# Patient Record
Sex: MALE | Race: OTHER | Employment: OTHER | URBAN - METROPOLITAN AREA
[De-identification: names, ages, dates, MRNs, and addresses within clinical notes are randomized per-mention and may not be internally consistent; named-entity substitution may affect disease eponyms.]

---

## 2018-06-19 NOTE — ED NOTES
Tolerated suturing well. Bleeding controlled at this time. Pt denies any new complaints. Awaiting disposition.

## 2018-06-19 NOTE — ED INITIAL ASSESSMENT (HPI)
Pt with states a piece of wood cut his leg today- now bleeding. Pt was at work- bandage in place, controlled. Tetanus not up to date.

## 2018-06-19 NOTE — ED PROVIDER NOTES
Patient Seen in: [facility] AND [facility] Emergency Department    History   CC: leg wound  HPI: Xochilt Duncan 52year old male w/ hx HTN, and hyperlipidemia who presents to the ER c/o right-sided leg abrasion which occurred over a varicose vein causing vari General - Appears well, non-toxic and in NAD  Head - Appears symmetrical without deformity/swelling cranium, scalp, or facial bones  Skin - + small, <0.25cm abrasion noted to the right mid distal, anterior shin. + actively bleeding.  Skin otherwise

## 2018-12-03 PROBLEM — I10 ESSENTIAL HYPERTENSION: Status: ACTIVE | Noted: 2018-12-03

## 2018-12-03 PROBLEM — G47.33 OSA (OBSTRUCTIVE SLEEP APNEA): Status: ACTIVE | Noted: 2018-12-03

## 2018-12-03 PROBLEM — Z78.9 ALCOHOL USE: Status: ACTIVE | Noted: 2018-12-03

## 2018-12-03 PROBLEM — F10.90 ALCOHOL USE: Status: ACTIVE | Noted: 2018-12-03

## 2018-12-03 NOTE — PROGRESS NOTES
HPI:    Patient ID: Tania Abraham is a 52year old male. HPI  Patient comes in today for first time to establish care also needs annual physical exam for insurance.   He has history of high blood pressure cholesterol is taking medications complaint h Date   • REPAIR ING HERNIA,5+Y/O,REDUCIBL        History reviewed. No pertinent family history. Social History: Social History    Tobacco Use      Smoking status: Never Smoker      Smokeless tobacco: Never Used    Alcohol use:  Yes      Alcohol/week: 1.2 severe obstructive sleep apnea he has not gotten his CPAP machine  Alcohol use needs to stop drinking especially during the week more than 1 beer or drink at night his son healthy and this probably makes his sleep apnea worse.   Bmi 40.0-44.9, adult (hcc) t

## 2018-12-04 NOTE — PATIENT INSTRUCTIONS
ASSESSMENT/PLAN:   Encounter to establish care  (primary encounter diagnosis)-patient here for first time to establish care we will order labs  Annual physical exam exam as above will order labs  Essential hypertension-well-controlled  Acute pain of right

## 2019-06-02 NOTE — ED INITIAL ASSESSMENT (HPI)
Pt c/o bleeding, states he was in the shower and he started bleeding from right lower leg. No bleeding at this time. Denies injury to area.

## 2019-06-04 NOTE — ED PROVIDER NOTES
Patient Seen in: [facility] AND [facility] Emergency Department    History   Patient presents with:  Bleeding (hematologic)    Stated Complaint: varicose vein bleeding    HPI    61 yo male bleeding from a varicose vein started while he was in the shower prior to normal.   Nursing note and vitals reviewed.            ED Course   Labs Reviewed - No data to display           MDM                 Disposition and Plan     Clinical Impression:  Bleeding from varicose veins of lower extremity, right  (primary encounter brando

## 2019-09-02 NOTE — ED PROVIDER NOTES
Patient Seen in: [facility] AND [facility] Emergency Department    History   Patient presents with:  Varicose Veins (cardiovascular)  Bleeding (hematologic)    Stated Complaint: Bleeding varicose vein    HPI    HPI: Sudeep Zafar is a 62year old male who pr leg with small ulcerated varicosity with slow oozing of blood  . 2+ distal pulses. NEURO:Sensation to touch is intact. SKIN: laceration noted above  PSYCH: Normal affect. Calm and cooperative.         ED Course   Labs Reviewed - No data to display    Pro

## 2019-09-02 NOTE — ED INITIAL ASSESSMENT (HPI)
The patient reports bleeding from anterior left lower leg varicose vein since last night at 2000 pm. Pressure applied, bleeding controlled.

## 2019-09-02 NOTE — ED NOTES
Pt able to dress independently and ambulates with steady gait. Discharge instructions reviewed and pt/family verbalized understanding. Accompanied by family.

## 2020-07-20 NOTE — TELEPHONE ENCOUNTER
Spoke to pt to see if he is seeing Dr Elif Underwood as his PCP or if he has a new PCP. Pt states not knowing for sure whom is his PCP and will have his wife call back to give us an update.    Pt is due for physical if they are still seeing dr Skye Kay as their PCP,

## 2023-07-13 NOTE — PATIENT INSTRUCTIONS
1. Schedule colonoscopy with monitored anesthesia care (MAC) at the hospital    2.  bowel prep from pharmacy (split trilyte or golytely). As we discussed it is important to take the bowel preparation in two parts taking 2L of the liquid the night before the procedure and the second 2L the morning of the procedure starting approximately 6 hours prior to your scheduled procedure time. 3. Medication    Hold metformin the day before and day of the procedure  Otherwise, continue all medications for procedure    4. Read all bowel prep instructions carefully    5. AVOID seeds, nuts, popcorn, raw fruits and vegetables (cooked is okay) for 2-3 days before procedure    >>>Please note: if you were prescribed a bowel prep and it is too expensive or not covered by insurance, it is okay to substitute Trilyte or Golytely (or any similar generic prep). This can be done by notifying the pharmacy or calling our office.

## 2023-07-13 NOTE — TELEPHONE ENCOUNTER
Scheduled for:  Colonoscopy 30776/17732  Provider Name:  Dr. Ashley Causey  Date:  09/29/2023  Location:  [place]  Sedation:  MAC  Time:  2:00 pm, arrival 1:00 pm  Prep:  Golytely  Meds/Allergies Reconciled?:  Physician reviewed  Diagnosis with codes:  Screening for colon cancer Z12.11; Hx of colon polyps Z86.010  Was patient informed to call insurance with codes (Y/N):  Yes  Referral sent?:  Referral was sent at the time of electronic surgical scheduling. [address] [place] [address] or [facility] [address] [place] [address] notified?: I sent an electronic request to Endo Scheduling and received a confirmation today. Medication Orders: \"Hold metformin the day before and day of the procedure\"  Misc Orders:  N/A     Further instructions given by staff:  I provided patient with [place] prep instruction sheet.

## 2023-07-27 NOTE — TELEPHONE ENCOUNTER
Results were discussed with patient's wife, she will inform him and she was advised to have him keep his appt with Dr Lee Denis.      ----- Message from Randee Donovan MD sent at 7/25/2023 11:57 AM CDT -----  Please inform him that his fecal occult blood test is negative. I still recommend strongly that he follows with gastroenterology. He has established with Dr. Lee Denis and has colonoscopy scheduled in September 2023.

## 2023-09-22 ENCOUNTER — TELEPHONE (OUTPATIENT)
Dept: PULMONOLOGY | Facility: CLINIC | Age: 58
End: 2023-09-22

## 2023-09-22 NOTE — TELEPHONE ENCOUNTER
[facility] with CMN order. Placed in [employer] for signature.

## 2023-09-28 ENCOUNTER — TELEPHONE (OUTPATIENT)
Facility: CLINIC | Age: 58
End: 2023-09-28

## 2023-09-28 NOTE — TELEPHONE ENCOUNTER
[facility] has questions regarding prep instructions for 9/29/2023 CLN. Please call. Thank you.

## 2023-09-29 ENCOUNTER — ANESTHESIA (OUTPATIENT)
Dept: ENDOSCOPY | Facility: HOSPITAL | Age: 58
End: 2023-09-29
Payer: COMMERCIAL

## 2023-09-29 ENCOUNTER — ANESTHESIA EVENT (OUTPATIENT)
Dept: ENDOSCOPY | Facility: HOSPITAL | Age: 58
End: 2023-09-29
Payer: COMMERCIAL

## 2023-09-29 ENCOUNTER — HOSPITAL ENCOUNTER (OUTPATIENT)
Facility: HOSPITAL | Age: 58
Setting detail: HOSPITAL OUTPATIENT SURGERY
Discharge: HOME | End: 2023-09-29
Attending: INTERNAL MEDICINE | Admitting: INTERNAL MEDICINE
Payer: COMMERCIAL

## 2023-09-29 VITALS
RESPIRATION RATE: 16 BRPM | HEIGHT: 64 IN | DIASTOLIC BLOOD PRESSURE: 82 MMHG | HEART RATE: 67 BPM | OXYGEN SATURATION: 95 % | WEIGHT: 270 LBS | BODY MASS INDEX: 46.1 KG/M2 | SYSTOLIC BLOOD PRESSURE: 128 MMHG

## 2023-09-29 DIAGNOSIS — K63.5 SIGMOID POLYP: ICD-10-CM

## 2023-09-29 DIAGNOSIS — K63.5 POLYP OF CECUM: ICD-10-CM

## 2023-09-29 DIAGNOSIS — K63.5 POLYP OF DESCENDING COLON: ICD-10-CM

## 2023-09-29 DIAGNOSIS — Z12.11 SCREEN FOR COLON CANCER: ICD-10-CM

## 2023-09-29 DIAGNOSIS — K63.5 POLYP OF TRANSVERSE COLON, UNSPECIFIED TYPE: ICD-10-CM

## 2023-09-29 DIAGNOSIS — Z86.010 HISTORY OF COLON POLYPS: ICD-10-CM

## 2023-09-29 PROBLEM — Z12.12 ENCOUNTER FOR COLORECTAL CANCER SCREENING: Status: ACTIVE | Noted: 2023-09-29

## 2023-09-29 LAB — GLUCOSE BLDC GLUCOMTR-MCNC: 113 MG/DL (ref 70–99)

## 2023-09-29 PROCEDURE — 45380 COLONOSCOPY AND BIOPSY: CPT | Performed by: INTERNAL MEDICINE

## 2023-09-29 PROCEDURE — 0DBN8ZX EXCISION OF SIGMOID COLON, VIA NATURAL OR ARTIFICIAL OPENING ENDOSCOPIC, DIAGNOSTIC: ICD-10-PCS | Performed by: INTERNAL MEDICINE

## 2023-09-29 PROCEDURE — 0DBL8ZX EXCISION OF TRANSVERSE COLON, VIA NATURAL OR ARTIFICIAL OPENING ENDOSCOPIC, DIAGNOSTIC: ICD-10-PCS | Performed by: INTERNAL MEDICINE

## 2023-09-29 PROCEDURE — 0DBH8ZX EXCISION OF CECUM, VIA NATURAL OR ARTIFICIAL OPENING ENDOSCOPIC, DIAGNOSTIC: ICD-10-PCS | Performed by: INTERNAL MEDICINE

## 2023-09-29 PROCEDURE — 45385 COLONOSCOPY W/LESION REMOVAL: CPT | Performed by: INTERNAL MEDICINE

## 2023-09-29 RX ORDER — SODIUM CHLORIDE, SODIUM LACTATE, POTASSIUM CHLORIDE, CALCIUM CHLORIDE 600; 310; 30; 20 MG/100ML; MG/100ML; MG/100ML; MG/100ML
INJECTION, SOLUTION INTRAVENOUS CONTINUOUS
Status: DISCONTINUED | OUTPATIENT
Start: 2023-09-29 | End: 2023-09-29

## 2023-09-29 RX ORDER — LIDOCAINE HYDROCHLORIDE 10 MG/ML
INJECTION, SOLUTION EPIDURAL; INFILTRATION; INTRACAUDAL; PERINEURAL AS NEEDED
Status: DISCONTINUED | OUTPATIENT
Start: 2023-09-29 | End: 2023-09-29 | Stop reason: SURG

## 2023-09-29 RX ADMIN — SODIUM CHLORIDE, SODIUM LACTATE, POTASSIUM CHLORIDE, CALCIUM CHLORIDE: 600; 310; 30; 20 INJECTION, SOLUTION INTRAVENOUS at 14:11:00

## 2023-09-29 RX ADMIN — LIDOCAINE HYDROCHLORIDE 50 MG: 10 INJECTION, SOLUTION EPIDURAL; INFILTRATION; INTRACAUDAL; PERINEURAL at 14:12:00

## 2023-09-29 NOTE — ANESTHESIA POSTPROCEDURE EVALUATION
Patient: Rebecca Magaña    Procedure Summary       Date: 09/29/23 Room / Location: [address] [place] [address] ENDOSCOPY 01 / [address] [place] [address] ENDOSCOPY    Anesthesia Start: 9549 Anesthesia Stop:     Procedure: COLONOSCOPY Diagnosis:       Screen for colon cancer      History of colon polyps      (colon polyps, diverticulosis, hemorrhoids)    Surgeons: Ruby Goss MD Anesthesiologist: Mabel Khan CRNA    Anesthesia Type: MAC ASA Status: 3            Anesthesia Type: MAC    Vitals Value Taken Time   BP 90/71 09/29/23 1442   Temp  09/29/23 1443   Pulse 81 09/29/23 1442   Resp 20 09/29/23 1442   SpO2 93 % 09/29/23 1442       [facility] AN Post Evaluation:   Patient Evaluated in Patient location: endo 20. Patient Participation: complete - patient participated  Level of Consciousness: awake and alert  Pain Score: 0  Pain Management: adequate  Airway Patency:patent  Dental exam unchanged from preop  Yes    Cardiovascular Status: stable  Respiratory Status: room air  Postoperative Hydration stable      [facility]  Jm Brooks CRNA  9/29/2023 2:43 PM

## 2023-09-29 NOTE — DISCHARGE INSTRUCTIONS

## 2023-09-29 NOTE — H&P
History & Physical Examination    Patient Name: Larisa Valdovinos  MRN: [id number]  CSN: [id number]  YOB: 1965    Diagnosis: colorectal cancer screening, history of adenomatous colon polyps    Olmesartan Medoxomil-HCTZ 40-25 MG Oral Tab, Take 1 tablet by mouth daily. , Disp: 90 tablet, Rfl: 0, 2023 at 0800  metFORMIN  MG Oral Tablet 24 Hr, Take 1 tablet (500 mg total) by mouth daily. , Disp: 90 tablet, Rfl: 0, 2023 at 0800  atorvastatin 20 MG Oral Tab, Take 1 tablet (20 mg total) by mouth nightly., Disp: 90 tablet, Rfl: 0, 2023 at 2100  azelastine 0.1 % Nasal Solution, 1 spray by Nasal route 2 (two) times daily. , Disp: 1 each, Rfl: 1  albuterol 108 (90 Base) MCG/ACT Inhalation Aero Soln, Inhale 1 puff into the lungs every 4 (four) hours as needed. , Disp: 1 each, Rfl: 1, 2023 at 1800  PEG 3350-KCl-NaBcb-NaCl-NaSulf (PEG-3350/ELECTROLYTES) 236 g Oral Recon Soln, , Disp: , Rfl:   [] polyethylene glycol, PEG 3350-KCl-NaBcb-NaCl-NaSulf, 236 g Oral Recon Soln, Take 4,000 mL by mouth once for 1 dose., Disp: 4000 mL, Rfl: 0      lactated ringers infusion, , Intravenous, Continuous        Allergies: No Known Allergies    Past Medical History:   Diagnosis Date    Diabetes ([facility] [address]     Essential hypertension     High blood pressure     High cholesterol     Hyperlipidemia      Past Surgical History:   Procedure Laterality Date    REPAIR ING HERNIA,5+Y/O,REDUCIBL       Family History   Problem Relation Age of Onset    Heart Disorder Father     Cancer Mother     No Known Problems Daughter     No Known Problems Son     No Known Problems Sister      Social History    Tobacco Use      Smoking status: Never      Smokeless tobacco: Never    Alcohol use: Not Currently      Alcohol/week: 2.0 standard drinks of alcohol      Types: 2 Cans of beer per week      SYSTEM Check if Physical Exam is Normal If not normal, please explain:   SILVIA Puckett  [ Lonnie Hernandez [ Brittni Jolly [ Mimi Esteban ABDOMEN [ Tosha Divers [ X]      I have discussed the risks and benefits and alternatives of the procedure with the patient/family. They understand and agree to proceed with plan of care. I have reviewed the History and Physical done within the last 30 days. Any changes noted above.   Jake Glaser MD  [facility] - Gastroenterology  9/29/2023  2:09 PM

## 2023-10-05 ENCOUNTER — TELEPHONE (OUTPATIENT)
Dept: GASTROENTEROLOGY | Facility: CLINIC | Age: 58
End: 2023-10-05

## 2023-10-05 NOTE — TELEPHONE ENCOUNTER
I mailed out colonoscopy results letter to pt  Updated health maintenance  Entered into 3 yr CLN recall  Recall colon in 3 years per.  Colon done 9/29/2023    Rolin Collet, MD  [facility] Clinical Staff  GI staff: please place recall for colonoscopy in 3 years

## 2023-11-22 ENCOUNTER — HOSPITAL ENCOUNTER (OUTPATIENT)
Dept: CT IMAGING | Facility: HOSPITAL | Age: 58
Discharge: HOME | End: 2023-11-22
Attending: PHYSICIAN ASSISTANT
Payer: COMMERCIAL

## 2023-11-22 DIAGNOSIS — R93.89 ABNORMAL CHEST X-RAY: ICD-10-CM

## 2023-11-22 LAB
CREAT BLD-MCNC: 0.8 MG/DL
EGFRCR SERPLBLD CKD-EPI 2021: 101 ML/MIN/1.73M2 (ref 60–?)

## 2023-11-22 PROCEDURE — 71260 CT THORAX DX C+: CPT | Performed by: PHYSICIAN ASSISTANT

## 2023-11-22 PROCEDURE — 82565 ASSAY OF CREATININE: CPT

## 2023-11-22 RX ORDER — IOHEXOL 350 MG/ML
80 INJECTION, SOLUTION INTRAVENOUS
Status: COMPLETED | OUTPATIENT
Start: 2023-11-22 | End: 2023-11-22

## 2023-11-22 RX ADMIN — IOHEXOL 80 ML: 350 INJECTION, SOLUTION INTRAVENOUS at 17:31:00

## 2023-11-28 ENCOUNTER — OFFICE VISIT (OUTPATIENT)
Dept: INTERNAL MEDICINE CLINIC | Facility: CLINIC | Age: 58
End: 2023-11-28
Payer: COMMERCIAL

## 2023-11-28 VITALS
WEIGHT: 271 LBS | SYSTOLIC BLOOD PRESSURE: 136 MMHG | DIASTOLIC BLOOD PRESSURE: 70 MMHG | OXYGEN SATURATION: 95 % | BODY MASS INDEX: 47 KG/M2 | TEMPERATURE: 98 F | HEART RATE: 68 BPM

## 2023-11-28 DIAGNOSIS — G47.33 OSA (OBSTRUCTIVE SLEEP APNEA): ICD-10-CM

## 2023-11-28 DIAGNOSIS — Z09 FOLLOW-UP EXAM: Primary | ICD-10-CM

## 2023-11-28 DIAGNOSIS — E78.5 HYPERLIPIDEMIA, UNSPECIFIED HYPERLIPIDEMIA TYPE: ICD-10-CM

## 2023-11-28 DIAGNOSIS — E66.01 MORBID OBESITY (HCC): ICD-10-CM

## 2023-11-28 DIAGNOSIS — I10 ESSENTIAL HYPERTENSION: ICD-10-CM

## 2023-11-28 DIAGNOSIS — E11.9 CONTROLLED TYPE 2 DIABETES MELLITUS WITHOUT COMPLICATION, WITHOUT LONG-TERM CURRENT USE OF INSULIN (HCC): ICD-10-CM

## 2023-11-28 LAB
CARTRIDGE LOT#: 637 NUMERIC
HEMOGLOBIN A1C: 5.8 % (ref 4.3–5.6)

## 2023-11-28 PROCEDURE — 90686 IIV4 VACC NO PRSV 0.5 ML IM: CPT | Performed by: INTERNAL MEDICINE

## 2023-11-28 PROCEDURE — 3075F SYST BP GE 130 - 139MM HG: CPT | Performed by: INTERNAL MEDICINE

## 2023-11-28 PROCEDURE — 90471 IMMUNIZATION ADMIN: CPT | Performed by: INTERNAL MEDICINE

## 2023-11-28 PROCEDURE — 3044F HG A1C LEVEL LT 7.0%: CPT | Performed by: INTERNAL MEDICINE

## 2023-11-28 PROCEDURE — 99214 OFFICE O/P EST MOD 30 MIN: CPT | Performed by: INTERNAL MEDICINE

## 2023-11-28 PROCEDURE — 83036 HEMOGLOBIN GLYCOSYLATED A1C: CPT | Performed by: INTERNAL MEDICINE

## 2023-11-28 PROCEDURE — 3078F DIAST BP <80 MM HG: CPT | Performed by: INTERNAL MEDICINE

## 2023-11-29 ENCOUNTER — TELEPHONE (OUTPATIENT)
Dept: PULMONOLOGY | Facility: CLINIC | Age: 58
End: 2023-11-29

## 2023-11-29 NOTE — TELEPHONE ENCOUNTER
[facility], [address] [address] [facility] calling for mutual patient to inform that patients insurance is requesting letter of medical necessity, in order for patient to have a sleep study. Please send through fax to [facility], Fax [phone],AONQQW.

## 2023-12-01 ENCOUNTER — TELEPHONE (OUTPATIENT)
Dept: PULMONOLOGY | Facility: CLINIC | Age: 58
End: 2023-12-01

## 2023-12-01 DIAGNOSIS — J18.1 LEFT LOWER LOBE CONSOLIDATION (HCC): ICD-10-CM

## 2023-12-01 DIAGNOSIS — R93.89 ABNORMAL CT OF THE CHEST: Primary | ICD-10-CM

## 2023-12-01 RX ORDER — LEVOFLOXACIN 750 MG/1
750 TABLET, FILM COATED ORAL DAILY
Qty: 7 TABLET | Refills: 0 | Status: SHIPPED | OUTPATIENT
Start: 2023-12-01

## 2023-12-01 NOTE — TELEPHONE ENCOUNTER
RN: I spoke with patient and his son. States he received home oxygen concentrator and larger tanks. He did not receive M6 tanks or portable oxygen concentrator. He works outside the home and needs portable device. Can you please facilitate with HME? Thank you!

## 2023-12-01 NOTE — TELEPHONE ENCOUNTER
RN: I generated letter. Can you please ensure this was sent properly to [address]? Thank you!

## 2023-12-07 ENCOUNTER — OFFICE VISIT (OUTPATIENT)
Dept: SLEEP CENTER | Age: 58
End: 2023-12-07
Attending: PHYSICIAN ASSISTANT
Payer: COMMERCIAL

## 2023-12-07 DIAGNOSIS — G47.33 OBSTRUCTIVE SLEEP APNEA: Primary | ICD-10-CM

## 2023-12-07 PROCEDURE — 95811 POLYSOM 6/>YRS CPAP 4/> PARM: CPT

## 2023-12-11 ENCOUNTER — ORDER TRANSCRIPTION (OUTPATIENT)
Dept: SLEEP CENTER | Age: 58
End: 2023-12-11

## 2023-12-11 DIAGNOSIS — G47.33 OSA (OBSTRUCTIVE SLEEP APNEA): Primary | ICD-10-CM

## 2023-12-12 DIAGNOSIS — G47.33 OSA (OBSTRUCTIVE SLEEP APNEA): Primary | ICD-10-CM

## 2023-12-20 ENCOUNTER — TELEPHONE (OUTPATIENT)
Dept: PULMONOLOGY | Facility: CLINIC | Age: 58
End: 2023-12-20

## 2023-12-20 DIAGNOSIS — G47.33 OSA (OBSTRUCTIVE SLEEP APNEA): Primary | ICD-10-CM

## 2023-12-20 RX ORDER — ZOLPIDEM TARTRATE 10 MG/1
TABLET ORAL
Qty: 1 TABLET | Refills: 0 | Status: SHIPPED | OUTPATIENT
Start: 2023-12-20

## 2023-12-20 NOTE — TELEPHONE ENCOUNTER
CECY Aragon  [facility] Clinical Staff  RN: Please call the patient with Language Line to let him know sleep study again demonstrates very severe FERNANDO. Unfortunately he did not sleep much during the study so titration was inadequate. Recommend he go back to sleep center for titration study. If he struggles with insomnia and is worried he will not be able to fall asleep, I can send prescription for him to take zolpidem when he gets to the sleep center just for the night of study. Please let me know.

## 2023-12-21 NOTE — TELEPHONE ENCOUNTER
I signed order for CPAP titration and sent 1 tablet of zolpidem 10 mg for him to take the night of CPAP titration study after he arrives at [address]

## 2023-12-26 ENCOUNTER — OFFICE VISIT (OUTPATIENT)
Dept: SLEEP CENTER | Age: 58
End: 2023-12-26
Attending: PHYSICIAN ASSISTANT
Payer: COMMERCIAL

## 2023-12-26 DIAGNOSIS — G47.33 OSA (OBSTRUCTIVE SLEEP APNEA): Primary | ICD-10-CM

## 2023-12-26 PROCEDURE — 95811 POLYSOM 6/>YRS CPAP 4/> PARM: CPT

## 2024-01-02 ENCOUNTER — HOSPITAL ENCOUNTER (OUTPATIENT)
Dept: NUCLEAR MEDICINE | Facility: HOSPITAL | Age: 59
Discharge: HOME | End: 2024-01-02
Attending: PHYSICIAN ASSISTANT
Payer: COMMERCIAL

## 2024-01-02 DIAGNOSIS — J18.1 LEFT LOWER LOBE CONSOLIDATION (HCC): ICD-10-CM

## 2024-01-02 DIAGNOSIS — R93.89 ABNORMAL CT OF THE CHEST: ICD-10-CM

## 2024-01-02 DIAGNOSIS — G47.33 OSA (OBSTRUCTIVE SLEEP APNEA): Primary | ICD-10-CM

## 2024-01-02 LAB — GLUCOSE BLDC GLUCOMTR-MCNC: 116 MG/DL (ref 70–99)

## 2024-01-02 PROCEDURE — 78815 PET IMAGE W/CT SKULL-THIGH: CPT | Performed by: PHYSICIAN ASSISTANT

## 2024-01-02 PROCEDURE — 82962 GLUCOSE BLOOD TEST: CPT

## 2024-01-03 DIAGNOSIS — R91.8 OPACITY OF LUNG ON IMAGING STUDY: ICD-10-CM

## 2024-01-03 DIAGNOSIS — R93.89 ABNORMAL CT OF THE CHEST: Primary | ICD-10-CM

## 2024-01-05 ENCOUNTER — TELEPHONE (OUTPATIENT)
Dept: PULMONOLOGY | Facility: CLINIC | Age: 59
End: 2024-01-05

## 2024-01-05 NOTE — TELEPHONE ENCOUNTER
SLEEP STUDY SCAN: Result Notes     Salo Strickland PA-C  1/2/2024  6:13 PM CST       RN: Please call the patient with language line to let him know titration was successful. Please send order to HME for BiPAP and ensure patient understands he should use BiPAP with 2 L in line O2. Please facilitate appropriate follow up in 2-3 months. Thank you!

## 2024-01-05 NOTE — TELEPHONE ENCOUNTER
DME rx, office visit enctr 9/14/23, split night, titration, & pt reg facesheet faxed to [facility] @ #[phone]. Confirmation rcvd.

## 2024-01-08 NOTE — TELEPHONE ENCOUNTER
Pt gave consent to discuss his PHI w/ son. Pt's son aware of Salo's orders below. He accepted appt for pt w/ Salo on 3/21 9:30 am [facility]MOB. Appt info given. Explained pt has to f/u w/in 30-90 days of receipt of BiPAP for efficacy & compliance and to contact HME at #[phone] if he does not hear from them shortly. Pt's son voiced understanding.

## 2024-01-09 ENCOUNTER — TELEPHONE (OUTPATIENT)
Dept: PULMONOLOGY | Facility: CLINIC | Age: 59
End: 2024-01-09

## 2024-01-09 NOTE — TELEPHONE ENCOUNTER
----- Message from Salo Strickland PA-C sent at 1/3/2024  1:40 PM CST -----  Negative PET. Reviewed with Dr. Fox. Recommend follow-up CT chest in 4 months to demonstrate 6-month stability.     Attempted to call patient. No answer on cell phone or home phone.    RN: Please call the patient with Language Line to inform him the PET scan is unrevealing which is great news. He still has large area of abnormality at bottom of left lung that we need to follow with CT scans. Recommend follow-up in 4 months (May 2024), and I have placed the order. Please place order for CT chest in chronic calendar May 2024. Thank you!

## 2024-01-09 NOTE — TELEPHONE ENCOUNTER
Attempted to contact patient regarding Salo JUAREZ-MIRLANDE's result note below. Left message to call back.  Interpretor line used (Luis Antonio Greene #[id number])

## 2024-01-10 NOTE — TELEPHONE ENCOUNTER
Spoke with patient with interpretor manjinder (Pippa #[id number]) and informed of Salo SAM's message below. Patient verbalized understanding.     Order placed in chronic calendar.

## 2024-01-24 ENCOUNTER — OFFICE VISIT (OUTPATIENT)
Dept: INTERNAL MEDICINE CLINIC | Facility: CLINIC | Age: 59
End: 2024-01-24
Payer: COMMERCIAL

## 2024-01-24 ENCOUNTER — HOSPITAL ENCOUNTER (OUTPATIENT)
Dept: GENERAL RADIOLOGY | Facility: HOSPITAL | Age: 59
Discharge: HOME | End: 2024-01-24
Attending: INTERNAL MEDICINE
Payer: COMMERCIAL

## 2024-01-24 VITALS
SYSTOLIC BLOOD PRESSURE: 136 MMHG | OXYGEN SATURATION: 93 % | DIASTOLIC BLOOD PRESSURE: 64 MMHG | HEIGHT: 65.5 IN | HEART RATE: 64 BPM | BODY MASS INDEX: 44.61 KG/M2 | TEMPERATURE: 98 F | WEIGHT: 271 LBS

## 2024-01-24 DIAGNOSIS — M19.90 OSTEOARTHRITIS, UNSPECIFIED OSTEOARTHRITIS TYPE, UNSPECIFIED SITE: ICD-10-CM

## 2024-01-24 DIAGNOSIS — Z13.5 DIABETIC RETINOPATHY SCREENING: Primary | ICD-10-CM

## 2024-01-24 DIAGNOSIS — E78.5 HYPERLIPIDEMIA, UNSPECIFIED HYPERLIPIDEMIA TYPE: ICD-10-CM

## 2024-01-24 DIAGNOSIS — E11.9 CONTROLLED TYPE 2 DIABETES MELLITUS WITHOUT COMPLICATION, WITHOUT LONG-TERM CURRENT USE OF INSULIN (HCC): ICD-10-CM

## 2024-01-24 PROCEDURE — 3075F SYST BP GE 130 - 139MM HG: CPT | Performed by: INTERNAL MEDICINE

## 2024-01-24 PROCEDURE — 3008F BODY MASS INDEX DOCD: CPT | Performed by: INTERNAL MEDICINE

## 2024-01-24 PROCEDURE — 99214 OFFICE O/P EST MOD 30 MIN: CPT | Performed by: INTERNAL MEDICINE

## 2024-01-24 PROCEDURE — 73564 X-RAY EXAM KNEE 4 OR MORE: CPT | Performed by: INTERNAL MEDICINE

## 2024-01-24 PROCEDURE — 3078F DIAST BP <80 MM HG: CPT | Performed by: INTERNAL MEDICINE

## 2024-01-31 RX ORDER — VALSARTAN AND HYDROCHLOROTHIAZIDE 320; 25 MG/1; MG/1
1 TABLET, FILM COATED ORAL DAILY
Qty: 90 TABLET | Refills: 1 | Status: SHIPPED | OUTPATIENT
Start: 2024-01-31 | End: 2025-01-25

## 2024-01-31 NOTE — PROGRESS NOTES
[facility]  Return Patient      HPI:     Chief Complaint   Patient presents with    Physical    Test Results     Sleep study       Heath Casillas is a 58 year old male presenting for:  Follow up.    Has  has a past medical history of Colon adenomas (09/29/2023), Diabetes (HCC), Essential hypertension, High blood pressure, High cholesterol, and Hyperlipidemia.    Sign pmhx of chrissie.  Morbid obesity.  Has followed with pulmonology.     C scope completed.         Labs:   Complete Metabolic Panel:  Lab Results   Component Value Date/Time     06/28/2023 12:20 PM    K 4.4 06/28/2023 12:20 PM     06/28/2023 12:20 PM    CO2 33.0 (H) 06/28/2023 12:20 PM    CREATSERUM 0.75 06/28/2023 12:20 PM    CA 9.2 06/28/2023 12:20 PM     (H) 06/28/2023 12:20 PM    TP 7.9 06/28/2023 12:20 PM    ALB 3.6 06/28/2023 12:20 PM    ALKPHO 68 06/28/2023 12:20 PM    AST 17 06/28/2023 12:20 PM    ALT 29 06/28/2023 12:20 PM    BILT 0.6 06/28/2023 12:20 PM        Hemoglobin A1C, Microalbumin  Lab Results   Component Value Date/Time    A1C 5.8 (A) 11/28/2023 09:13 AM        Lipid panel  Lab Results   Component Value Date/Time    CHOLEST 96 06/28/2023 12:20 PM    HDL 44 06/28/2023 12:20 PM    TRIG 76 06/28/2023 12:20 PM    LDL 36 06/28/2023 12:20 PM    NONHDLC 52 06/28/2023 12:20 PM     The ASCVD Risk score (Delmis DK, et al., 2019) failed to calculate for the following reasons:    The valid total cholesterol range is 130 to 320 mg/dL       Medications:  Current Outpatient Medications   Medication Sig Dispense Refill    diclofenac 1 % External Gel Apply 2 g topically 4 (four) times daily. 1 each 1    Olmesartan Medoxomil-HCTZ 40-25 MG Oral Tab Take 1 tablet by mouth daily. 90 tablet 0    metFORMIN  MG Oral Tablet 24 Hr Take 1 tablet (500 mg total) by mouth daily. 90 tablet 0    atorvastatin 20 MG Oral Tab Take 1 tablet (20 mg total) by mouth nightly. 90 tablet 0    albuterol 108 (90 Base) MCG/ACT  Inhalation Aero Soln Inhale 1 puff into the lungs every 4 (four) hours as needed. 1 each 1    azelastine 0.1 % Nasal Solution 1 spray by Nasal route 2 (two) times daily. (Patient not taking: Reported on 1/24/2024) 1 each 1      PMH:  Past Medical History:   Diagnosis Date    Colon adenomas 09/29/2023    x3    Diabetes (HCC)     Essential hypertension     High blood pressure     High cholesterol     Hyperlipidemia          PSH:  Past Surgical History:   Procedure Laterality Date    COLONOSCOPY N/A 9/29/2023    Procedure: COLONOSCOPY;  Surgeon: Leonides Gracia MD;  Location: [facility] ENDOSCOPY    REPAIR ING HERNIA,5+Y/O,REDUCIBL         Allergies:  No Known Allergies   Social History:  Social History     Socioeconomic History    Marital status:    Tobacco Use    Smoking status: Never    Smokeless tobacco: Never   Vaping Use    Vaping Use: Never used   Substance and Sexual Activity    Alcohol use: Not Currently     Alcohol/week: 2.0 standard drinks of alcohol     Types: 2 Cans of beer per week    Drug use: No        Family History:  Family History   Problem Relation Age of Onset    Heart Disorder Father     Cancer Mother     No Known Problems Daughter     No Known Problems Son     No Known Problems Sister           REVIEW OF SYSTEMS:   Review of Systems   Constitutional:  Negative for chills, fatigue, fever and unexpected weight change.   HENT:  Negative for congestion, ear pain, hearing loss, rhinorrhea, sinus pain and sore throat.    Eyes:  Negative for pain, redness and visual disturbance.   Respiratory:  Negative for apnea, cough, chest tightness, shortness of breath and wheezing.    Cardiovascular:  Negative for chest pain, palpitations and leg swelling.   Gastrointestinal:  Negative for abdominal distention, abdominal pain, blood in stool, constipation and nausea.   Endocrine: Negative for cold intolerance, heat intolerance and polyuria.   Genitourinary:  Negative for dysuria, hematuria and urgency.    Musculoskeletal:  Negative for arthralgias, back pain, gait problem, joint swelling, myalgias and neck pain.   Skin:  Negative for rash and wound.   Allergic/Immunologic: Negative for food allergies and immunocompromised state.   Neurological:  Negative for dizziness, seizures, facial asymmetry, speech difficulty, weakness, light-headedness, numbness and headaches.   Hematological:  Negative for adenopathy. Does not bruise/bleed easily.   Psychiatric/Behavioral:  Negative for behavioral problems, sleep disturbance and suicidal ideas. The patient is not nervous/anxious.             PHYSICAL EXAM:   /64   Pulse 64   Temp 98.2 °F (36.8 °C) (Temporal)   Ht 5' 5.5\" (1.664 m)   Wt 271 lb (122.9 kg)   SpO2 93%   BMI 44.41 kg/m²  Estimated body mass index is 44.41 kg/m² as calculated from the following:    Height as of this encounter: 5' 5.5\" (1.664 m).    Weight as of this encounter: 271 lb (122.9 kg).     Wt Readings from Last 3 Encounters:   01/24/24 271 lb (122.9 kg)   11/28/23 271 lb (122.9 kg)   09/25/23 270 lb (122.5 kg)       Physical Exam  Vitals reviewed.   Constitutional:       General: He is not in acute distress.     Appearance: He is well-developed. He is obese.   HENT:      Head: Normocephalic and atraumatic.   Eyes:      Conjunctiva/sclera: Conjunctivae normal.      Pupils: Pupils are equal, round, and reactive to light.   Neck:      Thyroid: No thyromegaly.   Cardiovascular:      Rate and Rhythm: Normal rate and regular rhythm.      Heart sounds: Normal heart sounds, S1 normal and S2 normal. No murmur heard.     No friction rub. No gallop.   Pulmonary:      Effort: Pulmonary effort is normal. No respiratory distress.      Breath sounds: Normal breath sounds. No wheezing or rales.   Chest:      Chest wall: No tenderness.   Abdominal:      General: Bowel sounds are normal. There is no distension.      Palpations: Abdomen is soft. There is no mass.      Tenderness: There is no abdominal  tenderness. There is no guarding or rebound.   Musculoskeletal:         General: No tenderness. Normal range of motion.      Cervical back: Normal range of motion.   Lymphadenopathy:      Cervical: No cervical adenopathy.   Skin:     General: Skin is warm.      Findings: No erythema or rash.      Comments: Evidence of varicose veins bilateral Legs.     Neurological:      Mental Status: He is alert and oriented to person, place, and time.      Cranial Nerves: No cranial nerve deficit.      Deep Tendon Reflexes: Reflexes are normal and symmetric.   Psychiatric:         Behavior: Behavior normal.         Thought Content: Thought content normal.         Judgment: Judgment normal.       Bilateral barefoot skin diabetic exam is normal, visualized feet and the appearance is normal.  Bilateral monofilament/sensation of both feet is normal.  Pulsation pedal pulse exam of both lower legs/feet is normal as well.          ASSESSMENT AND PLAN:   Patient is a 58 year old male who presents primarily presents for:    (Z09) Follow-up exam  (primary encounter diagnosis)      (E11.9) Controlled type 2 diabetes mellitus without complication, without long-term current use of insulin (HCC)  Comment: Well controlled.  A1C 5.8. Continue metforming          (I10) Essential hypertension  Comment: Given his diabetes and risk of olmesartan with cardiovascular disease will switch to valsartan hydrochlorothiazide.   Plan: prescription sent     (G47.33) FERNANDO (obstructive sleep apnea)  Comment: Following with pulmonology.      (E66.01) Morbid obesity (HCC)  Comment: Discussed weight loss.  May be able to start GLP1 analogue.  I would like for him to stick to a strict diet.  He has not changed his diet much.  Does not want to start GLP 1 analogue yet.    Plan:     (E78.5) Hyperlipidemia, unspecified hyperlipidemia type  Comment: Continue on statin.    (Z13.5) Diabetic retinopathy screening    Plan: OPHTHALMOLOGY - INTERNAL            (M19.90)  Osteoarthritis, unspecified osteoarthritis type, unspecified site  Plan: XR KNEE, COMPLETE (4 OR MORE VIEWS), LEFT         (CPT=73564), diclofenac 1 % External Gel                                     Meds & Refills for this Visit:  Requested Prescriptions     Signed Prescriptions Disp Refills    diclofenac 1 % External Gel 1 each 1     Sig: Apply 2 g topically 4 (four) times daily.       Orders Placed This Encounter   Procedures    Comp Metabolic Panel (14) [E]    Lipid Panel [E]    Microalb/Creat Ratio, Random Urine       Imaging & Consults:  OPHTHALMOLOGY - INTERNAL        Norberto Nunez MD     Return in about 3 months (around 4/24/2024) for Glucose monitoring.  Important issues to follow up on next visit      Patient indicates understanding of the above recommendations and agrees to the above plan.  Reasurrance and education provided. All questions answered.  Notified to call with any questions, complications, allergies, or worsening or changing symptoms as well as any side effects or complications from the treatments .  Red flags/ ER precautions discussed.    Total time spent was 38 minutes which includes: Preparation to see patient including chart review, reviewing appropriate medical history, counseling and education (diet and exercise), discussing treatment options, ordering appropriate diagnostic tests and documentation.    Norberto Nunez MD  Internal Medicine/Primary Care  [facility]

## 2024-02-01 ENCOUNTER — TELEPHONE (OUTPATIENT)
Dept: INTERNAL MEDICINE CLINIC | Facility: CLINIC | Age: 59
End: 2024-02-01

## 2024-02-01 NOTE — TELEPHONE ENCOUNTER
LMTCB      ----- Message from Norberto Nunez MD sent at 1/31/2024  1:01 AM CST -----  Please inform him that knee xray does confirm osteoarthritis.  I recommend he follow up with physiatry for possible joint injection.      Please also inform him that I switched his HTN medication.  He was on olmesartan hydrochlorothiazide but in in the setting of diabetes and recent guidelines it is better for him to switch to valsartan hydrochlorothiazide.  Olmesartan associated with higher incidence of cardiovascular disease in diabetics.      New prescription sent.

## 2024-02-15 ENCOUNTER — TELEPHONE (OUTPATIENT)
Dept: INTERNAL MEDICINE CLINIC | Facility: CLINIC | Age: 59
End: 2024-02-15

## 2024-02-15 NOTE — TELEPHONE ENCOUNTER
S/w  #[id number] Negrito informed his spouse picked up the valsartin-htz on 2/6/24. Pt stated he was aware his spouse had picked up his prescription and did not say he did not have this medication. Pt was confused about stopping the olmesartan. Pt informed to stop olmesartan and start the valsartin-htz. Pt voiced understanding.

## 2024-02-15 NOTE — TELEPHONE ENCOUNTER
S/w with [facility] who confirmed the pt's spouse had picked up Valsartan-htz on 2/6/24 for a 30 day supply due to insurance restrictions.

## 2024-02-15 NOTE — TELEPHONE ENCOUNTER
S/w  #[id number] Sammi spoke to Hetah informed of prior message on 2/9/24 to stop olmseartan and start valsartan with notation the pt had already picked up this medication and will start on that day.

## 2024-02-15 NOTE — TELEPHONE ENCOUNTER
A week ago someone called to tell him to stop taking a medication and he is wondering which one to stop. Please advise.

## 2024-03-15 ENCOUNTER — TELEPHONE (OUTPATIENT)
Dept: PULMONOLOGY | Facility: CLINIC | Age: 59
End: 2024-03-15

## 2024-04-24 ENCOUNTER — LAB ENCOUNTER (OUTPATIENT)
Dept: LAB | Facility: HOSPITAL | Age: 59
End: 2024-04-24
Attending: INTERNAL MEDICINE
Payer: COMMERCIAL

## 2024-04-24 ENCOUNTER — OFFICE VISIT (OUTPATIENT)
Dept: INTERNAL MEDICINE CLINIC | Facility: CLINIC | Age: 59
End: 2024-04-24
Payer: COMMERCIAL

## 2024-04-24 VITALS
DIASTOLIC BLOOD PRESSURE: 78 MMHG | BODY MASS INDEX: 45.43 KG/M2 | OXYGEN SATURATION: 97 % | TEMPERATURE: 98 F | SYSTOLIC BLOOD PRESSURE: 126 MMHG | WEIGHT: 276 LBS | HEIGHT: 65.5 IN | HEART RATE: 54 BPM

## 2024-04-24 DIAGNOSIS — Z09 FOLLOW-UP EXAM: Primary | ICD-10-CM

## 2024-04-24 DIAGNOSIS — E11.9 CONTROLLED TYPE 2 DIABETES MELLITUS WITHOUT COMPLICATION, WITHOUT LONG-TERM CURRENT USE OF INSULIN (HCC): ICD-10-CM

## 2024-04-24 DIAGNOSIS — I10 ESSENTIAL HYPERTENSION: ICD-10-CM

## 2024-04-24 DIAGNOSIS — E66.01 MORBID OBESITY (HCC): ICD-10-CM

## 2024-04-24 LAB
ALBUMIN SERPL-MCNC: 4.3 G/DL (ref 3.2–4.8)
ALBUMIN/GLOB SERPL: 1.5 {RATIO} (ref 1–2)
ALP LIVER SERPL-CCNC: 51 U/L
ALT SERPL-CCNC: 32 U/L
ANION GAP SERPL CALC-SCNC: 6 MMOL/L (ref 0–18)
AST SERPL-CCNC: 24 U/L (ref ?–34)
BASOPHILS # BLD AUTO: 0.03 X10(3) UL (ref 0–0.2)
BASOPHILS NFR BLD AUTO: 0.4 %
BILIRUB SERPL-MCNC: 0.6 MG/DL (ref 0.2–1.1)
BUN BLD-MCNC: 20 MG/DL (ref 9–23)
BUN/CREAT SERPL: 21.7 (ref 10–20)
CALCIUM BLD-MCNC: 9.3 MG/DL (ref 8.7–10.4)
CHLORIDE SERPL-SCNC: 109 MMOL/L (ref 98–112)
CHOLEST SERPL-MCNC: 110 MG/DL (ref ?–200)
CO2 SERPL-SCNC: 29 MMOL/L (ref 21–32)
CREAT BLD-MCNC: 0.92 MG/DL
CREAT UR-SCNC: 106.4 MG/DL
DEPRECATED RDW RBC AUTO: 46.2 FL (ref 35.1–46.3)
EGFRCR SERPLBLD CKD-EPI 2021: 95 ML/MIN/1.73M2 (ref 60–?)
EOSINOPHIL # BLD AUTO: 0.17 X10(3) UL (ref 0–0.7)
EOSINOPHIL NFR BLD AUTO: 2.2 %
ERYTHROCYTE [DISTWIDTH] IN BLOOD BY AUTOMATED COUNT: 13.6 % (ref 11–15)
EST. AVERAGE GLUCOSE BLD GHB EST-MCNC: 148 MG/DL (ref 68–126)
FASTING PATIENT LIPID ANSWER: YES
FASTING STATUS PATIENT QL REPORTED: YES
GLOBULIN PLAS-MCNC: 2.9 G/DL (ref 2.8–4.4)
GLUCOSE BLD-MCNC: 102 MG/DL (ref 70–99)
HBA1C MFR BLD: 6.8 % (ref ?–5.7)
HCT VFR BLD AUTO: 34.7 %
HDLC SERPL-MCNC: 35 MG/DL (ref 40–59)
HGB BLD-MCNC: 11.5 G/DL
IMM GRANULOCYTES # BLD AUTO: 0.02 X10(3) UL (ref 0–1)
IMM GRANULOCYTES NFR BLD: 0.3 %
LDLC SERPL CALC-MCNC: 53 MG/DL (ref ?–100)
LYMPHOCYTES # BLD AUTO: 2.5 X10(3) UL (ref 1–4)
LYMPHOCYTES NFR BLD AUTO: 32.9 %
MCH RBC QN AUTO: 31.2 PG (ref 26–34)
MCHC RBC AUTO-ENTMCNC: 33.1 G/DL (ref 31–37)
MCV RBC AUTO: 94 FL
MICROALBUMIN UR-MCNC: <0.3 MG/DL
MONOCYTES # BLD AUTO: 0.65 X10(3) UL (ref 0.1–1)
MONOCYTES NFR BLD AUTO: 8.6 %
NEUTROPHILS # BLD AUTO: 4.23 X10 (3) UL (ref 1.5–7.7)
NEUTROPHILS # BLD AUTO: 4.23 X10(3) UL (ref 1.5–7.7)
NEUTROPHILS NFR BLD AUTO: 55.6 %
NONHDLC SERPL-MCNC: 75 MG/DL (ref ?–130)
OSMOLALITY SERPL CALC.SUM OF ELEC: 301 MOSM/KG (ref 275–295)
PLATELET # BLD AUTO: 172 10(3)UL (ref 150–450)
POTASSIUM SERPL-SCNC: 4.5 MMOL/L (ref 3.5–5.1)
PROT SERPL-MCNC: 7.2 G/DL (ref 5.7–8.2)
RBC # BLD AUTO: 3.69 X10(6)UL
SODIUM SERPL-SCNC: 144 MMOL/L (ref 136–145)
TRIGL SERPL-MCNC: 121 MG/DL (ref 30–149)
VLDLC SERPL CALC-MCNC: 17 MG/DL (ref 0–30)
WBC # BLD AUTO: 7.6 X10(3) UL (ref 4–11)

## 2024-04-24 PROCEDURE — 3008F BODY MASS INDEX DOCD: CPT | Performed by: INTERNAL MEDICINE

## 2024-04-24 PROCEDURE — 85025 COMPLETE CBC W/AUTO DIFF WBC: CPT | Performed by: INTERNAL MEDICINE

## 2024-04-24 PROCEDURE — 82043 UR ALBUMIN QUANTITATIVE: CPT | Performed by: INTERNAL MEDICINE

## 2024-04-24 PROCEDURE — 90471 IMMUNIZATION ADMIN: CPT | Performed by: INTERNAL MEDICINE

## 2024-04-24 PROCEDURE — 90750 HZV VACC RECOMBINANT IM: CPT | Performed by: INTERNAL MEDICINE

## 2024-04-24 PROCEDURE — 99214 OFFICE O/P EST MOD 30 MIN: CPT | Performed by: INTERNAL MEDICINE

## 2024-04-24 PROCEDURE — 82570 ASSAY OF URINE CREATININE: CPT | Performed by: INTERNAL MEDICINE

## 2024-04-24 PROCEDURE — 83036 HEMOGLOBIN GLYCOSYLATED A1C: CPT | Performed by: INTERNAL MEDICINE

## 2024-04-24 PROCEDURE — 80061 LIPID PANEL: CPT | Performed by: INTERNAL MEDICINE

## 2024-04-24 PROCEDURE — 3074F SYST BP LT 130 MM HG: CPT | Performed by: INTERNAL MEDICINE

## 2024-04-24 PROCEDURE — 80053 COMPREHEN METABOLIC PANEL: CPT | Performed by: INTERNAL MEDICINE

## 2024-04-24 PROCEDURE — 3078F DIAST BP <80 MM HG: CPT | Performed by: INTERNAL MEDICINE

## 2024-04-24 RX ORDER — FEXOFENADINE HCL 180 MG/1
180 TABLET ORAL DAILY
Qty: 90 TABLET | Refills: 1 | Status: SHIPPED | OUTPATIENT
Start: 2024-04-24

## 2024-04-24 RX ORDER — METFORMIN HYDROCHLORIDE 500 MG/1
500 TABLET, EXTENDED RELEASE ORAL DAILY
Qty: 90 TABLET | Refills: 0 | Status: SHIPPED | OUTPATIENT
Start: 2024-04-24

## 2024-04-24 RX ORDER — AZELASTINE 1 MG/ML
1 SPRAY, METERED NASAL 2 TIMES DAILY
Qty: 1 EACH | Refills: 1 | Status: SHIPPED | OUTPATIENT
Start: 2024-04-24

## 2024-04-24 RX ORDER — ATORVASTATIN CALCIUM 20 MG/1
20 TABLET, FILM COATED ORAL NIGHTLY
Qty: 90 TABLET | Refills: 0 | Status: SHIPPED | OUTPATIENT
Start: 2024-04-24

## 2024-04-24 RX ORDER — VALSARTAN AND HYDROCHLOROTHIAZIDE 320; 25 MG/1; MG/1
1 TABLET, FILM COATED ORAL DAILY
Qty: 90 TABLET | Refills: 1 | Status: SHIPPED | OUTPATIENT
Start: 2024-04-24 | End: 2025-04-19

## 2024-04-24 NOTE — PROGRESS NOTES
[facility]  Return Patient      HPI:     Chief Complaint   Patient presents with    Follow - Up       Heath Casillas is a 59 year old male presenting for:  Follow up.    Has  has a past medical history of Colon adenomas (09/29/2023), Diabetes (HCC), Essential hypertension, High blood pressure, High cholesterol, and Hyperlipidemia.    Sign pmhx of chrissie.  Morbid obesity.  Has followed with pulmonology.     C scope completed.             Labs:   Complete Metabolic Panel:  Lab Results   Component Value Date/Time     06/28/2023 12:20 PM    K 4.4 06/28/2023 12:20 PM     06/28/2023 12:20 PM    CO2 33.0 (H) 06/28/2023 12:20 PM    CREATSERUM 0.75 06/28/2023 12:20 PM    CA 9.2 06/28/2023 12:20 PM     (H) 06/28/2023 12:20 PM    TP 7.9 06/28/2023 12:20 PM    ALB 3.6 06/28/2023 12:20 PM    ALKPHO 68 06/28/2023 12:20 PM    AST 17 06/28/2023 12:20 PM    ALT 29 06/28/2023 12:20 PM    BILT 0.6 06/28/2023 12:20 PM        Hemoglobin A1C, Microalbumin  Lab Results   Component Value Date/Time    A1C 5.8 (A) 11/28/2023 09:13 AM        Lipid panel  Lab Results   Component Value Date/Time    CHOLEST 96 06/28/2023 12:20 PM    HDL 44 06/28/2023 12:20 PM    TRIG 76 06/28/2023 12:20 PM    LDL 36 06/28/2023 12:20 PM    NONHDLC 52 06/28/2023 12:20 PM     The ASCVD Risk score (Delmis DK, et al., 2019) failed to calculate for the following reasons:    The valid total cholesterol range is 130 to 320 mg/dL       Medications:  Current Outpatient Medications   Medication Sig Dispense Refill    Valsartan-hydroCHLOROthiazide 320-25 MG Oral Tab Take 1 tablet by mouth daily. 90 tablet 1    diclofenac 1 % External Gel Apply 2 g topically 4 (four) times daily. 1 each 1    metFORMIN  MG Oral Tablet 24 Hr Take 1 tablet (500 mg total) by mouth daily. 90 tablet 0    atorvastatin 20 MG Oral Tab Take 1 tablet (20 mg total) by mouth nightly. 90 tablet 0    azelastine 0.1 % Nasal Solution 1 spray by Nasal  route 2 (two) times daily. 1 each 1    albuterol 108 (90 Base) MCG/ACT Inhalation Aero Soln Inhale 1 puff into the lungs every 4 (four) hours as needed. 1 each 1      PMH:  Past Medical History:    Colon adenomas    x3    Diabetes (HCC)    Essential hypertension    High blood pressure    High cholesterol    Hyperlipidemia         PSH:  Past Surgical History:   Procedure Laterality Date    Colonoscopy N/A 9/29/2023    Procedure: COLONOSCOPY;  Surgeon: Leonides Gracia MD;  Location: [facility] ENDOSCOPY    Repair ing hernia,3+y/o,reducibl         Allergies:  No Known Allergies   Social History:  Social History     Socioeconomic History    Marital status:    Tobacco Use    Smoking status: Never    Smokeless tobacco: Never   Vaping Use    Vaping status: Never Used   Substance and Sexual Activity    Alcohol use: Not Currently     Alcohol/week: 2.0 standard drinks of alcohol     Types: 2 Cans of beer per week    Drug use: No        Family History:  Family History   Problem Relation Age of Onset    Heart Disorder Father     Cancer Mother     No Known Problems Daughter     No Known Problems Son     No Known Problems Sister           REVIEW OF SYSTEMS:   Review of Systems   Constitutional:  Negative for chills, fatigue, fever and unexpected weight change.   HENT:  Negative for congestion, ear pain, hearing loss, rhinorrhea, sinus pain and sore throat.    Eyes:  Negative for pain, redness and visual disturbance.   Respiratory:  Negative for apnea, cough, chest tightness, shortness of breath and wheezing.    Cardiovascular:  Negative for chest pain, palpitations and leg swelling.   Gastrointestinal:  Negative for abdominal distention, abdominal pain, blood in stool, constipation and nausea.   Endocrine: Negative for cold intolerance, heat intolerance and polyuria.   Genitourinary:  Negative for dysuria, hematuria and urgency.   Musculoskeletal:  Negative for arthralgias, back pain, gait problem, joint swelling, myalgias  and neck pain.   Skin:  Negative for rash and wound.   Allergic/Immunologic: Negative for food allergies and immunocompromised state.   Neurological:  Negative for dizziness, seizures, facial asymmetry, speech difficulty, weakness, light-headedness, numbness and headaches.   Hematological:  Negative for adenopathy. Does not bruise/bleed easily.   Psychiatric/Behavioral:  Negative for behavioral problems, sleep disturbance and suicidal ideas. The patient is not nervous/anxious.             PHYSICAL EXAM:   /78   Pulse 54   Temp 97.8 °F (36.6 °C)   Ht 5' 5.5\" (1.664 m)   Wt 276 lb (125.2 kg)   SpO2 97%   BMI 45.23 kg/m²  Estimated body mass index is 45.23 kg/m² as calculated from the following:    Height as of this encounter: 5' 5.5\" (1.664 m).    Weight as of this encounter: 276 lb (125.2 kg).     Wt Readings from Last 3 Encounters:   04/24/24 276 lb (125.2 kg)   01/24/24 271 lb (122.9 kg)   11/28/23 271 lb (122.9 kg)       Physical Exam  Vitals reviewed.   Constitutional:       General: He is not in acute distress.     Appearance: He is well-developed. He is obese.   HENT:      Head: Normocephalic and atraumatic.   Eyes:      Conjunctiva/sclera: Conjunctivae normal.      Pupils: Pupils are equal, round, and reactive to light.   Neck:      Thyroid: No thyromegaly.   Cardiovascular:      Rate and Rhythm: Normal rate and regular rhythm.      Heart sounds: Normal heart sounds, S1 normal and S2 normal. No murmur heard.     No friction rub. No gallop.   Pulmonary:      Effort: Pulmonary effort is normal. No respiratory distress.      Breath sounds: Normal breath sounds. No wheezing or rales.   Chest:      Chest wall: No tenderness.   Abdominal:      General: Bowel sounds are normal. There is no distension.      Palpations: Abdomen is soft. There is no mass.      Tenderness: There is no abdominal tenderness. There is no guarding or rebound.   Musculoskeletal:         General: No tenderness. Normal range of  motion.      Cervical back: Normal range of motion.   Lymphadenopathy:      Cervical: No cervical adenopathy.   Skin:     General: Skin is warm.      Findings: No erythema or rash.      Comments: Evidence of varicose veins bilateral Legs.     Neurological:      Mental Status: He is alert and oriented to person, place, and time.      Cranial Nerves: No cranial nerve deficit.      Deep Tendon Reflexes: Reflexes are normal and symmetric.   Psychiatric:         Behavior: Behavior normal.         Thought Content: Thought content normal.         Judgment: Judgment normal.       Bilateral barefoot skin diabetic exam is normal, visualized feet and the appearance is normal.  Bilateral monofilament/sensation of both feet is normal.  Pulsation pedal pulse exam of both lower legs/feet is normal as well.          ASSESSMENT AND PLAN:   Patient is a 59 year old male who presents primarily presents for:    (Z09) Follow-up exam  (primary encounter diagnosis)  Plan: CBC With Differential With Platelet      (E11.9) Controlled type 2 diabetes mellitus without complication, without long-term current use of insulin (HCC)  Comment: Well controlled.  A1C 5.8. Continue metformin.  Checking today.       (I10) Essential hypertension  Comment: controlled.  ON valsartan hctz  Plan: prescription sent     (G47.33) FERNANDO (obstructive sleep apnea)  Comment: Following with pulmonology.      (E66.01) Morbid obesity (HCC)  Comment: Discussed weight loss.  May be able to start GLP1 analogue.  I would like for him to stick to a strict diet.  He has not changed his diet much.  Does not want to start GLP 1 analogue yet.    Plan:     (E78.5) Hyperlipidemia, unspecified hyperlipidemia type  Comment: Continue on statin.    (Z13.5) Diabetic retinopathy screening    Plan: OPHTHALMOLOGY - INTERNAL up to date.                                       Meds & Refills for this Visit:  Requested Prescriptions      No prescriptions requested or ordered in this encounter        No orders of the defined types were placed in this encounter.      Imaging & Consults:  None        Norberto Nunez MD     No follow-ups on file.  Important issues to follow up on next visit      Patient indicates understanding of the above recommendations and agrees to the above plan.  Reasurrance and education provided. All questions answered.  Notified to call with any questions, complications, allergies, or worsening or changing symptoms as well as any side effects or complications from the treatments .  Red flags/ ER precautions discussed.    Total time spent was 35 minutes which includes: Preparation to see patient including chart review, reviewing appropriate medical history, counseling and education (diet and exercise), discussing treatment options, ordering appropriate diagnostic tests and documentation.    Norberto Nunez MD  Internal Medicine/Primary Care  [facility]

## 2024-05-29 ENCOUNTER — HOSPITAL ENCOUNTER (OUTPATIENT)
Dept: CT IMAGING | Facility: HOSPITAL | Age: 59
Discharge: HOME | End: 2024-05-29
Attending: PHYSICIAN ASSISTANT
Payer: COMMERCIAL

## 2024-05-29 DIAGNOSIS — R91.8 OPACITY OF LUNG ON IMAGING STUDY: ICD-10-CM

## 2024-05-29 DIAGNOSIS — R93.89 ABNORMAL CT OF THE CHEST: ICD-10-CM

## 2024-05-29 PROCEDURE — 71250 CT THORAX DX C-: CPT | Performed by: PHYSICIAN ASSISTANT

## 2024-07-25 ENCOUNTER — TELEPHONE (OUTPATIENT)
Dept: PULMONOLOGY | Facility: CLINIC | Age: 59
End: 2024-07-25

## 2024-07-25 NOTE — TELEPHONE ENCOUNTER
Received request from [facility] for CPAP resupply via parachute. Placed in Salo SAM's folder to sign.

## 2024-07-31 NOTE — TELEPHONE ENCOUNTER
Order signed and faxed back to [facility]. Confirmation reveived and sent to scanning

## 2024-09-26 ENCOUNTER — OFFICE VISIT (OUTPATIENT)
Dept: PULMONOLOGY | Facility: CLINIC | Age: 59
End: 2024-09-26

## 2024-09-26 VITALS
BODY MASS INDEX: 45 KG/M2 | HEIGHT: 66 IN | SYSTOLIC BLOOD PRESSURE: 146 MMHG | RESPIRATION RATE: 18 BRPM | HEART RATE: 67 BPM | DIASTOLIC BLOOD PRESSURE: 77 MMHG | WEIGHT: 280 LBS | OXYGEN SATURATION: 98 %

## 2024-09-26 DIAGNOSIS — G47.33 OSA (OBSTRUCTIVE SLEEP APNEA): Primary | ICD-10-CM

## 2024-09-26 DIAGNOSIS — E66.01 CLASS 3 SEVERE OBESITY DUE TO EXCESS CALORIES WITH SERIOUS COMORBIDITY AND BODY MASS INDEX (BMI) OF 45.0 TO 49.9 IN ADULT (HCC): ICD-10-CM

## 2024-09-26 DIAGNOSIS — R93.89 ABNORMAL CT OF THE CHEST: ICD-10-CM

## 2024-09-26 PROCEDURE — 3077F SYST BP >= 140 MM HG: CPT | Performed by: PHYSICIAN ASSISTANT

## 2024-09-26 PROCEDURE — 99214 OFFICE O/P EST MOD 30 MIN: CPT | Performed by: PHYSICIAN ASSISTANT

## 2024-09-26 PROCEDURE — 94761 N-INVAS EAR/PLS OXIMETRY MLT: CPT | Performed by: PHYSICIAN ASSISTANT

## 2024-09-26 PROCEDURE — 3078F DIAST BP <80 MM HG: CPT | Performed by: PHYSICIAN ASSISTANT

## 2024-09-26 PROCEDURE — 3008F BODY MASS INDEX DOCD: CPT | Performed by: PHYSICIAN ASSISTANT

## 2024-09-26 RX ORDER — AMLODIPINE BESYLATE 5 MG/1
5 TABLET ORAL DAILY
COMMUNITY

## 2024-09-26 NOTE — PROGRESS NOTES
Order for Overnight oximetry to be completed while patient is on BiPAP was faxed to [facility]. Confirmation received and sent to scanning

## 2024-09-26 NOTE — PATIENT INSTRUCTIONS
CT chest due May 2025  Continue using BiPAP with sleep and increase use to every night  We will complete overnight oxygen study while you are wearing BiPAP to determine if you need in line oxygen or if we can discontinue  I will recheck BiPAP report in 1 month

## 2024-09-26 NOTE — PROGRESS NOTES
Pulmonary Progress Note    History of Present Illness:  Heath Casillas is a 59 year old male presenting to pulmonary clinic today for follow up. Last seen 9/2023. He has severe FERNANDO with significant hypoxemia and is using BiPAP with 2 L O2 most nights. He went on vacation for a week last month and did not travel with his machine. Overall he tolerates BiPAP well and notes improvement in his sleep. He feels more rested in the morning and has nocturia x1 which is improved. Data download demonstrates average usage of 5 hours and 38 minutes on nights used with residual respiratory events of 1/h on BiPAP 22/17. Patient has history of abnormal CT chest with 3 x 5 cm left lower lobe focus. He was treated for possibility of pneumonia and had subsequent negative PET scan 1/2024. Follow up CT chest 5/2024 demonstrated decrease in left lower lobe focus to 4.4 cm, likely representing atelectasis. He has supplemental O2 at home although does not use. He is unable to use oxygen at his job. He does not feel he needs oxygen. He has no shortness of breath, cough, or wheezing. His job is very active and he does not feel limited by his breathing.    Social History: , has 2 kids, landscaping  -Tobacco: Lifelong nonsmoker  -Alcohol: Occasional  -Pets: Dog    Medications: has a current medication list which includes the following prescription(s): amlodipine, fexofenadine, atorvastatin, metformin er, valsartan-hydrochlorothiazide, azelastine, diclofenac, and albuterol.    Review of Systems:   Constitutional: +Weight gain. No fever or chills.   HEENT: No congestion or postnasal drip.  Cardio: No chest pain.  Respiratory: See HPI.  GI: No abdominal pain or acid reflux.  Extremities: +Occasional leg swelling.  Neurologic: No headache.  Psych: No depression.     Physical Exam:  /77   Pulse 67   Resp 18   Ht 5' 6\" (1.676 m)   Wt 280 lb (127 kg)   SpO2 98%   BMI 45.19 kg/m²    Constitutional: Obese. No acute distress.    HEENT: Head NC/AT. PEERL. Crowded oropharynx. Mallampati class 4.  Cardio: Regular rate and rhythm. Normal S1 and S2. No murmurs.   Respiratory: Thorax symmetrical with no labored breathing. Clear to ausculation bilaterally with symmetrical breath sounds. No wheezing, rhonchi, or crackles.   Extremities: No clubbing or cyanosis. No LE edema. No calf tenderness.  Neurologic: A&Ox3. No gross motor deficits.  Skin: Warm, dry.  Psych: Calm, cooperative. Pleasant affect.    Results:  -Diagnostic PSG 12/2023: Severe FERNANDO with combined AHI 97.5. Oxygen kanika 63%. Patient spent 43.9% of sleep time with oxygen levels below 88%.    -Titration study 12/2023: BiPAP 22/17 CWP.    -Ambulatory oximetry today in office: Oxygen saturations maintained 91% and above.    Assessment/Plan:  Severe FERNANDO - Tolerating BiPAP well although needs to increase use. FERNANDO is very well treated on current settings. Previously required O2 and is using 2 L O2 in line with BiPAP. Will check nocturnal oximetry while on BiPAP to determine if in line O2 is needed.  Plan:  -Nocturnal oximetry while on BiPAP  -BiPAP nightly  -Weight loss and referral to bariatric clinic  -Avoid alcohol and sedating drugs  -Never drive if sleepy  -Follow up in 1 year again with data download    Abnormal CT chest - CT chest 5/2024 with mild decrease in left lower lobe focus which was previously PET negative. Likely atelectasis. Previously d/w Dr. Fox, recommends 1 year follow up CT chest.  Plan:  -CT chest 5/2025    Hypoxia - Did not desaturate on ambulatory oximetry today and does not use O2 at home with exception of use with BiPAP. Will check nocturnal oximetry to determine ongoing need for O2.  Plan:  -Nocturnal oximetry while on BiPAP  -If oxygen saturations acceptable while on BiPAP, can discontinue O2    Salo Strickland PA-C  Pulmonary Medicine  9/26/2024

## 2024-10-10 ENCOUNTER — OFFICE VISIT (OUTPATIENT)
Dept: INTERNAL MEDICINE CLINIC | Facility: CLINIC | Age: 59
End: 2024-10-10
Payer: COMMERCIAL

## 2024-10-10 VITALS
OXYGEN SATURATION: 97 % | SYSTOLIC BLOOD PRESSURE: 130 MMHG | BODY MASS INDEX: 44.36 KG/M2 | DIASTOLIC BLOOD PRESSURE: 70 MMHG | WEIGHT: 276 LBS | HEART RATE: 63 BPM | HEIGHT: 66 IN

## 2024-10-10 DIAGNOSIS — E66.01 MORBID OBESITY (HCC): ICD-10-CM

## 2024-10-10 DIAGNOSIS — I10 ESSENTIAL HYPERTENSION: ICD-10-CM

## 2024-10-10 DIAGNOSIS — E11.9 CONTROLLED TYPE 2 DIABETES MELLITUS WITHOUT COMPLICATION, WITHOUT LONG-TERM CURRENT USE OF INSULIN (HCC): Primary | ICD-10-CM

## 2024-10-10 DIAGNOSIS — G47.33 OSA (OBSTRUCTIVE SLEEP APNEA): ICD-10-CM

## 2024-10-10 DIAGNOSIS — E78.5 HYPERLIPIDEMIA, UNSPECIFIED HYPERLIPIDEMIA TYPE: ICD-10-CM

## 2024-10-10 PROCEDURE — 3008F BODY MASS INDEX DOCD: CPT | Performed by: INTERNAL MEDICINE

## 2024-10-10 PROCEDURE — 3061F NEG MICROALBUMINURIA REV: CPT | Performed by: INTERNAL MEDICINE

## 2024-10-10 PROCEDURE — 3075F SYST BP GE 130 - 139MM HG: CPT | Performed by: INTERNAL MEDICINE

## 2024-10-10 PROCEDURE — 90750 HZV VACC RECOMBINANT IM: CPT | Performed by: INTERNAL MEDICINE

## 2024-10-10 PROCEDURE — 99214 OFFICE O/P EST MOD 30 MIN: CPT | Performed by: INTERNAL MEDICINE

## 2024-10-10 PROCEDURE — 90472 IMMUNIZATION ADMIN EACH ADD: CPT | Performed by: INTERNAL MEDICINE

## 2024-10-10 PROCEDURE — 3044F HG A1C LEVEL LT 7.0%: CPT | Performed by: INTERNAL MEDICINE

## 2024-10-10 PROCEDURE — 3078F DIAST BP <80 MM HG: CPT | Performed by: INTERNAL MEDICINE

## 2024-10-10 PROCEDURE — 90656 IIV3 VACC NO PRSV 0.5 ML IM: CPT | Performed by: INTERNAL MEDICINE

## 2024-10-10 PROCEDURE — 90471 IMMUNIZATION ADMIN: CPT | Performed by: INTERNAL MEDICINE

## 2024-10-10 RX ORDER — AMLODIPINE BESYLATE 5 MG/1
5 TABLET ORAL DAILY
Qty: 90 TABLET | Refills: 1 | Status: SHIPPED | OUTPATIENT
Start: 2024-10-10

## 2024-10-10 RX ORDER — ATORVASTATIN CALCIUM 20 MG/1
20 TABLET, FILM COATED ORAL NIGHTLY
Qty: 90 TABLET | Refills: 0 | Status: SHIPPED | OUTPATIENT
Start: 2024-10-10

## 2024-10-10 RX ORDER — METFORMIN HCL 500 MG
500 TABLET, EXTENDED RELEASE 24 HR ORAL DAILY
Qty: 90 TABLET | Refills: 0 | Status: SHIPPED | OUTPATIENT
Start: 2024-10-10

## 2024-10-10 RX ORDER — VALSARTAN AND HYDROCHLOROTHIAZIDE 320; 25 MG/1; MG/1
1 TABLET, FILM COATED ORAL DAILY
Qty: 90 TABLET | Refills: 1 | Status: SHIPPED | OUTPATIENT
Start: 2024-10-10 | End: 2025-10-05

## 2024-10-10 NOTE — PROGRESS NOTES
[facility] part of [facility]  Return Patient Progress Note      HPI:     Chief Complaint   Patient presents with    Follow - Up     6 mo follow up  Pt is fasting        Heath Casillas is a 59 year old male presenting for:    Has a significant  has a past medical history of Colon adenomas (09/29/2023), Diabetes (HCC), Essential hypertension, High blood pressure, High cholesterol, and Hyperlipidemia.    DM Ii on metformin.     HTN on valsaran hydrochlorothiazide, amlodipine.      HLD on atorvastatin      Labs:   CMP:  Lab Results   Component Value Date/Time     04/24/2024 10:28 AM    K 4.5 04/24/2024 10:28 AM     04/24/2024 10:28 AM    CO2 29.0 04/24/2024 10:28 AM    CREATSERUM 0.92 04/24/2024 10:28 AM    CA 9.3 04/24/2024 10:28 AM     (H) 04/24/2024 10:28 AM    TP 7.2 04/24/2024 10:28 AM    ALB 4.3 04/24/2024 10:28 AM    ALKPHO 51 04/24/2024 10:28 AM    AST 24 04/24/2024 10:28 AM    ALT 32 04/24/2024 10:28 AM    BILT 0.6 04/24/2024 10:28 AM        CBC:  Lab Results   Component Value Date    WBC 7.6 04/24/2024    HGB 11.5 (L) 04/24/2024    HCT 34.7 (L) 04/24/2024    .0 04/24/2024    NEPERCENT 55.6 04/24/2024    LYPERCENT 32.9 04/24/2024    MOPERCENT 8.6 04/24/2024    EOPERCENT 2.2 04/24/2024    BAPERCENT 0.4 04/24/2024    NE 4.23 04/24/2024    LYMABS 2.50 04/24/2024    MOABSO 0.65 04/24/2024    EOABSO 0.17 04/24/2024    BAABSO 0.03 04/24/2024          Hemoglobin A1C, Microalbumin  Lab Results   Component Value Date/Time    A1C 6.8 (H) 04/24/2024 10:28 AM        Lipid panel  Lab Results   Component Value Date/Time    CHOLEST 110 04/24/2024 10:28 AM    HDL 35 (L) 04/24/2024 10:28 AM    TRIG 121 04/24/2024 10:28 AM    LDL 53 04/24/2024 10:28 AM    NONHDLC 75 04/24/2024 10:28 AM        Medications:  Current Outpatient Medications   Medication Sig Dispense Refill    atorvastatin 20 MG Oral Tab Take 1 tablet (20 mg total) by mouth nightly. 90 tablet 0    metFORMIN   MG Oral Tablet 24 Hr Take 1 tablet (500 mg total) by mouth daily. 90 tablet 0    Valsartan-hydroCHLOROthiazide 320-25 MG Oral Tab Take 1 tablet by mouth daily. 90 tablet 1    amLODIPine 5 MG Oral Tab Take 1 tablet (5 mg total) by mouth daily. 90 tablet 1    fexofenadine (ALLEGRA ALLERGY) 180 MG Oral Tab Take 1 tablet (180 mg total) by mouth daily. 90 tablet 1    azelastine 0.1 % Nasal Solution 1 spray by Nasal route 2 (two) times daily. 1 each 1    diclofenac 1 % External Gel Apply 2 g topically 4 (four) times daily. 1 each 1    albuterol 108 (90 Base) MCG/ACT Inhalation Aero Soln Inhale 1 puff into the lungs every 4 (four) hours as needed. 1 each 1      PMH:  Past Medical History:    Colon adenomas    x3    Diabetes (HCC)    Essential hypertension    High blood pressure    High cholesterol    Hyperlipidemia               REVIEW OF SYSTEMS:   Review of Systems   Constitutional:  Negative for chills, fatigue, fever and unexpected weight change.   HENT:  Negative for congestion, ear pain, hearing loss, rhinorrhea, sinus pain and sore throat.    Eyes:  Negative for pain, redness and visual disturbance.   Respiratory:  Negative for apnea, cough, chest tightness, shortness of breath and wheezing.    Cardiovascular:  Negative for chest pain, palpitations and leg swelling.   Gastrointestinal:  Negative for abdominal distention, abdominal pain, blood in stool, constipation and nausea.   Endocrine: Negative for cold intolerance, heat intolerance and polyuria.   Genitourinary:  Negative for dysuria, hematuria and urgency.   Musculoskeletal:  Negative for arthralgias, back pain, gait problem, joint swelling, myalgias and neck pain.   Skin:  Negative for rash and wound.   Allergic/Immunologic: Negative for food allergies and immunocompromised state.   Neurological:  Negative for dizziness, seizures, facial asymmetry, speech difficulty, weakness, light-headedness, numbness and headaches.   Hematological:  Negative for  adenopathy. Does not bruise/bleed easily.   Psychiatric/Behavioral:  Negative for behavioral problems, sleep disturbance and suicidal ideas. The patient is not nervous/anxious.             PHYSICAL EXAM:   /70   Pulse 63   Ht 5' 6\" (1.676 m)   Wt 276 lb (125.2 kg)   SpO2 97%   BMI 44.55 kg/m²  Estimated body mass index is 44.55 kg/m² as calculated from the following:    Height as of this encounter: 5' 6\" (1.676 m).    Weight as of this encounter: 276 lb (125.2 kg).     Wt Readings from Last 3 Encounters:   10/10/24 276 lb (125.2 kg)   09/26/24 280 lb (127 kg)   04/24/24 276 lb (125.2 kg)       Physical Exam  Vitals reviewed.   Constitutional:       General: He is not in acute distress.     Appearance: He is well-developed.   HENT:      Head: Normocephalic and atraumatic.   Eyes:      Conjunctiva/sclera: Conjunctivae normal.      Pupils: Pupils are equal, round, and reactive to light.   Neck:      Thyroid: No thyromegaly.   Cardiovascular:      Rate and Rhythm: Normal rate and regular rhythm.      Heart sounds: Normal heart sounds, S1 normal and S2 normal. No murmur heard.     No friction rub. No gallop.   Pulmonary:      Effort: Pulmonary effort is normal. No respiratory distress.      Breath sounds: Normal breath sounds. No wheezing or rales.   Chest:      Chest wall: No tenderness.   Abdominal:      General: Bowel sounds are normal. There is no distension.      Palpations: Abdomen is soft. There is no mass.      Tenderness: There is no abdominal tenderness. There is no guarding or rebound.   Musculoskeletal:         General: No tenderness. Normal range of motion.      Cervical back: Normal range of motion.   Lymphadenopathy:      Cervical: No cervical adenopathy.   Skin:     General: Skin is warm.      Findings: No erythema or rash.   Neurological:      Mental Status: He is alert and oriented to person, place, and time.      Cranial Nerves: No cranial nerve deficit.      Deep Tendon Reflexes: Reflexes  are normal and symmetric.   Psychiatric:         Behavior: Behavior normal.         Thought Content: Thought content normal.         Judgment: Judgment normal.       Bilateral barefoot skin diabetic exam is normal, visualized feet and the appearance is normal.  Bilateral monofilament/sensation of both feet is normal.  Pulsation pedal pulse exam of both lower legs/feet is normal as well.            ASSESSMENT AND PLAN:   Patient is a 59 year old male who presents primarily presents for:    (E11.9) Controlled type 2 diabetes mellitus without complication, without long-term current use of insulin ([facility])  (primary encounter diagnosis)  Plan: Hemoglobin A1C (Glycohemoglobin) [E]            (E66.01) Morbid obesity (HCC)  Plan: Comp Metabolic Panel (14) [E]            (I10) Essential hypertension  Plan: On valsartan-hydrochlorothiazide, amlodipine.      (G47.33) FERNANOD (obstructive sleep apnea)  Plan: On CPAP    (E78.5) Hyperlipidemia, unspecified hyperlipidemia type  Plan: Compliant with statin            Health Maintenance:    Health Maintenance   Topic Date Due    Annual Physical  Never done    COVID-19 Vaccine (1 - 2023-24 season) Never done    Diabetes Care A1C  10/24/2024    Diabetes Care Foot Exam  01/24/2025    Diabetes Care Dilated Eye Exam  01/31/2025    Diabetes Care: GFR  04/24/2025    Diabetes Care: Microalb/Creat Ratio  04/24/2025    PSA  06/28/2025    Colorectal Cancer Screening  09/29/2030    DTaP,Tdap,and Td Vaccines (4 - Td or Tdap) 08/23/2032    Influenza Vaccine  Completed    Annual Depression Screening  Completed    Pneumococcal Vaccine: Birth to 64yrs  Completed    Zoster Vaccines  Completed         Meds & Refills for this Visit:  Requested Prescriptions     Signed Prescriptions Disp Refills    atorvastatin 20 MG Oral Tab 90 tablet 0     Sig: Take 1 tablet (20 mg total) by mouth nightly.    metFORMIN  MG Oral Tablet 24 Hr 90 tablet 0     Sig: Take 1 tablet (500 mg total) by mouth daily.     Valsartan-hydroCHLOROthiazide 320-25 MG Oral Tab 90 tablet 1     Sig: Take 1 tablet by mouth daily.    amLODIPine 5 MG Oral Tab 90 tablet 1     Sig: Take 1 tablet (5 mg total) by mouth daily.       Orders Placed This Encounter   Procedures    Hemoglobin A1C (Glycohemoglobin) [E]    Comp Metabolic Panel (14) [E]    Zoster Recombinant Adjuvanted (Shingrix -Shingles) [82795]    Fluzone trivalent vaccine, PF 0.5mL, 6mo+ (56840)       Imaging & Consults:  ZOSTER VACC RECOMBINANT IM NJX  INFLUENZA VACCINE, TRI, PRESERV FREE, 0.5 ML          Return in about 6 months (around 4/10/2025).  Important follow up notes/labs for next visit      Patient indicates understanding of the above recommendations and agrees to the above plan.  Reasurrance and education provided. All questions answered.    Notified to call with any questions, complications, allergies, or worsening or changing symptoms as well as any side effects or complications from the treatments .  Red flags/ ER precautions discussed.    If diagnostic labs or imaging ordered advised patient to contact my office for results  24-48 hours after completion    This note was dictated using dragon speech recognition transcription software.  Typographical and transcription errors may be present.  Please call if any questions.             Norberto Nunez MD  [facility]

## 2024-10-28 ENCOUNTER — TELEPHONE (OUTPATIENT)
Dept: PULMONOLOGY | Facility: CLINIC | Age: 59
End: 2024-10-28

## 2024-12-12 ENCOUNTER — TELEPHONE (OUTPATIENT)
Dept: INTERNAL MEDICINE CLINIC | Facility: CLINIC | Age: 59
End: 2024-12-12

## 2024-12-12 NOTE — TELEPHONE ENCOUNTER
Son of pt came to dropped off form for physical to be filled out. Form was put in dr. Nunez's mailbox.       Son did ask if it could be faxed.       Please call and advise

## 2024-12-12 NOTE — TELEPHONE ENCOUNTER
Dr. Nunez completed patient's form, and it is ready at the . Pt was notified.    Copy sent to scanning.

## 2025-01-02 RX ORDER — METFORMIN HYDROCHLORIDE 500 MG/1
500 TABLET, EXTENDED RELEASE ORAL DAILY
Qty: 90 TABLET | Refills: 1 | Status: SHIPPED | OUTPATIENT
Start: 2025-01-02

## 2025-01-02 RX ORDER — ATORVASTATIN CALCIUM 20 MG/1
20 TABLET, FILM COATED ORAL NIGHTLY
Qty: 90 TABLET | Refills: 1 | Status: SHIPPED | OUTPATIENT
Start: 2025-01-02

## 2025-03-07 ENCOUNTER — TELEPHONE (OUTPATIENT)
Dept: PULMONOLOGY | Facility: CLINIC | Age: 60
End: 2025-03-07

## 2025-03-07 NOTE — TELEPHONE ENCOUNTER
Received message from [facility] via [employer] for office visit note 12/29/24 and onward that discuss patient oxygen usage and continuous need. Sent office visit note from 9/26/2024. Waiting to see if patient needs another appointment.

## 2025-03-28 RX ORDER — AMLODIPINE BESYLATE 5 MG/1
5 TABLET ORAL DAILY
Qty: 90 TABLET | Refills: 2 | Status: SHIPPED | OUTPATIENT
Start: 2025-03-28

## 2025-04-04 ENCOUNTER — TELEPHONE (OUTPATIENT)
Dept: PULMONOLOGY | Facility: CLINIC | Age: 60
End: 2025-04-04

## 2025-04-04 DIAGNOSIS — R93.89 ABNORMAL CT OF THE CHEST: Primary | ICD-10-CM

## 2025-04-28 ENCOUNTER — LAB ENCOUNTER (OUTPATIENT)
Dept: LAB | Age: 60
End: 2025-04-28
Attending: INTERNAL MEDICINE
Payer: COMMERCIAL

## 2025-04-28 ENCOUNTER — OFFICE VISIT (OUTPATIENT)
Age: 60
End: 2025-04-28
Payer: COMMERCIAL

## 2025-04-28 VITALS
HEIGHT: 66 IN | BODY MASS INDEX: 45.8 KG/M2 | SYSTOLIC BLOOD PRESSURE: 120 MMHG | HEART RATE: 77 BPM | OXYGEN SATURATION: 93 % | TEMPERATURE: 97 F | WEIGHT: 285 LBS | DIASTOLIC BLOOD PRESSURE: 60 MMHG

## 2025-04-28 DIAGNOSIS — Z12.5 ENCOUNTER FOR SCREENING FOR MALIGNANT NEOPLASM OF PROSTATE: ICD-10-CM

## 2025-04-28 DIAGNOSIS — Z00.00 ANNUAL PHYSICAL EXAM: ICD-10-CM

## 2025-04-28 DIAGNOSIS — G47.33 OSA (OBSTRUCTIVE SLEEP APNEA): ICD-10-CM

## 2025-04-28 DIAGNOSIS — E66.01 MORBID OBESITY (HCC): ICD-10-CM

## 2025-04-28 DIAGNOSIS — Z00.00 ANNUAL PHYSICAL EXAM: Primary | ICD-10-CM

## 2025-04-28 DIAGNOSIS — E11.9 CONTROLLED TYPE 2 DIABETES MELLITUS WITHOUT COMPLICATION, WITHOUT LONG-TERM CURRENT USE OF INSULIN (HCC): ICD-10-CM

## 2025-04-28 LAB
ALBUMIN SERPL-MCNC: 4.5 G/DL (ref 3.2–4.8)
ALBUMIN/GLOB SERPL: 1.6 {RATIO} (ref 1–2)
ALP LIVER SERPL-CCNC: 59 U/L (ref 45–117)
ALT SERPL-CCNC: 55 U/L (ref 10–49)
ANION GAP SERPL CALC-SCNC: 6 MMOL/L (ref 0–18)
AST SERPL-CCNC: 35 U/L (ref ?–34)
BASOPHILS # BLD AUTO: 0.05 X10(3) UL (ref 0–0.2)
BASOPHILS NFR BLD AUTO: 0.7 %
BILIRUB SERPL-MCNC: 0.4 MG/DL (ref 0.2–1.1)
BUN BLD-MCNC: 23 MG/DL (ref 9–23)
BUN/CREAT SERPL: 24.5 (ref 10–20)
CALCIUM BLD-MCNC: 9.1 MG/DL (ref 8.7–10.4)
CARTRIDGE EXPIRATION DATE: ABNORMAL DATE
CHLORIDE SERPL-SCNC: 103 MMOL/L (ref 98–112)
CHOLEST SERPL-MCNC: 118 MG/DL (ref ?–200)
CO2 SERPL-SCNC: 29 MMOL/L (ref 21–32)
COMPLEXED PSA SERPL-MCNC: 0.18 NG/ML (ref ?–4)
CREAT BLD-MCNC: 0.94 MG/DL (ref 0.7–1.3)
CREAT UR-SCNC: 43.5 MG/DL
DEPRECATED RDW RBC AUTO: 46 FL (ref 35.1–46.3)
EGFRCR SERPLBLD CKD-EPI 2021: 92 ML/MIN/1.73M2 (ref 60–?)
EOSINOPHIL # BLD AUTO: 0.22 X10(3) UL (ref 0–0.7)
EOSINOPHIL NFR BLD AUTO: 3 %
ERYTHROCYTE [DISTWIDTH] IN BLOOD BY AUTOMATED COUNT: 13.5 % (ref 11–15)
FASTING PATIENT LIPID ANSWER: YES
FASTING STATUS PATIENT QL REPORTED: YES
GLOBULIN PLAS-MCNC: 2.8 G/DL (ref 2–3.5)
GLUCOSE BLD-MCNC: 106 MG/DL (ref 70–99)
HCT VFR BLD AUTO: 38.6 % (ref 39–53)
HDLC SERPL-MCNC: 34 MG/DL (ref 40–59)
HEMOGLOBIN A1C: 6 % (ref 4.3–5.6)
HGB BLD-MCNC: 13.4 G/DL (ref 13–17.5)
IMM GRANULOCYTES # BLD AUTO: 0.05 X10(3) UL (ref 0–1)
IMM GRANULOCYTES NFR BLD: 0.7 %
LDLC SERPL CALC-MCNC: 65 MG/DL (ref ?–100)
LYMPHOCYTES # BLD AUTO: 2.43 X10(3) UL (ref 1–4)
LYMPHOCYTES NFR BLD AUTO: 33.4 %
MCH RBC QN AUTO: 32.4 PG (ref 26–34)
MCHC RBC AUTO-ENTMCNC: 34.7 G/DL (ref 31–37)
MCV RBC AUTO: 93.5 FL (ref 80–100)
MICROALBUMIN UR-MCNC: 0.4 MG/DL
MICROALBUMIN/CREAT 24H UR-RTO: 9.2 UG/MG (ref ?–30)
MONOCYTES # BLD AUTO: 0.64 X10(3) UL (ref 0.1–1)
MONOCYTES NFR BLD AUTO: 8.8 %
NEUTROPHILS # BLD AUTO: 3.88 X10 (3) UL (ref 1.5–7.7)
NEUTROPHILS # BLD AUTO: 3.88 X10(3) UL (ref 1.5–7.7)
NEUTROPHILS NFR BLD AUTO: 53.4 %
NONHDLC SERPL-MCNC: 84 MG/DL (ref ?–130)
OSMOLALITY SERPL CALC.SUM OF ELEC: 290 MOSM/KG (ref 275–295)
PLATELET # BLD AUTO: 193 10(3)UL (ref 150–450)
POTASSIUM SERPL-SCNC: 3.8 MMOL/L (ref 3.5–5.1)
PROT SERPL-MCNC: 7.3 G/DL (ref 5.7–8.2)
RBC # BLD AUTO: 4.13 X10(6)UL (ref 4.3–5.7)
SODIUM SERPL-SCNC: 138 MMOL/L (ref 136–145)
TRIGL SERPL-MCNC: 100 MG/DL (ref 30–149)
VLDLC SERPL CALC-MCNC: 15 MG/DL (ref 0–30)
WBC # BLD AUTO: 7.3 X10(3) UL (ref 4–11)

## 2025-04-28 PROCEDURE — 82570 ASSAY OF URINE CREATININE: CPT | Performed by: INTERNAL MEDICINE

## 2025-04-28 PROCEDURE — 85025 COMPLETE CBC W/AUTO DIFF WBC: CPT | Performed by: INTERNAL MEDICINE

## 2025-04-28 PROCEDURE — 80061 LIPID PANEL: CPT | Performed by: INTERNAL MEDICINE

## 2025-04-28 PROCEDURE — 84153 ASSAY OF PSA TOTAL: CPT | Performed by: INTERNAL MEDICINE

## 2025-04-28 PROCEDURE — 82043 UR ALBUMIN QUANTITATIVE: CPT | Performed by: INTERNAL MEDICINE

## 2025-04-28 PROCEDURE — 80053 COMPREHEN METABOLIC PANEL: CPT | Performed by: INTERNAL MEDICINE

## 2025-04-28 NOTE — PROGRESS NOTES
[facility] part of [facility]  Return Patient Progress Note      HPI:     Chief Complaint   Patient presents with    Physical     Annual physical        Heath Casillas is a 60 year old male presenting for:    Has a significant  has a past medical history of Colon adenomas (09/29/2023), Diabetes (HCC), Essential hypertension, High blood pressure, High cholesterol, and Hyperlipidemia.    Here for annual.     DM Ii:  On metformin.      HTN on amlodipine, valsartan, hydrochlorothiazide.      HLD on atorvastatin.     FERNANDO on CPAP.      Labs:   CMP:  Lab Results   Component Value Date/Time     04/24/2024 10:28 AM    K 4.5 04/24/2024 10:28 AM     04/24/2024 10:28 AM    CO2 29.0 04/24/2024 10:28 AM    CREATSERUM 0.92 04/24/2024 10:28 AM    CA 9.3 04/24/2024 10:28 AM     (H) 04/24/2024 10:28 AM    TP 7.2 04/24/2024 10:28 AM    ALB 4.3 04/24/2024 10:28 AM    ALKPHO 51 04/24/2024 10:28 AM    AST 24 04/24/2024 10:28 AM    ALT 32 04/24/2024 10:28 AM    BILT 0.6 04/24/2024 10:28 AM        CBC:  Lab Results   Component Value Date    WBC 7.6 04/24/2024    HGB 11.5 (L) 04/24/2024    HCT 34.7 (L) 04/24/2024    .0 04/24/2024    NEPERCENT 55.6 04/24/2024    LYPERCENT 32.9 04/24/2024    MOPERCENT 8.6 04/24/2024    EOPERCENT 2.2 04/24/2024    BAPERCENT 0.4 04/24/2024    NE 4.23 04/24/2024    LYMABS 2.50 04/24/2024    MOABSO 0.65 04/24/2024    EOABSO 0.17 04/24/2024    BAABSO 0.03 04/24/2024          Hemoglobin A1C, Microalbumin  Lab Results   Component Value Date/Time    A1C 6.0 (A) 04/28/2025 08:36 AM        Lipid panel  Lab Results   Component Value Date/Time    CHOLEST 110 04/24/2024 10:28 AM    HDL 35 (L) 04/24/2024 10:28 AM    TRIG 121 04/24/2024 10:28 AM    LDL 53 04/24/2024 10:28 AM    NONHDLC 75 04/24/2024 10:28 AM        Medications:  Current Medications[1]   PMH:  Past Medical History[2]            REVIEW OF SYSTEMS:   Review of Systems         PHYSICAL EXAM:   /60    Pulse 77   Temp 97.3 °F (36.3 °C)   Ht 5' 6\" (1.676 m)   Wt 285 lb (129.3 kg)   SpO2 93%   BMI 46.00 kg/m²  Estimated body mass index is 46 kg/m² as calculated from the following:    Height as of this encounter: 5' 6\" (1.676 m).    Weight as of this encounter: 285 lb (129.3 kg).     Wt Readings from Last 3 Encounters:   04/28/25 285 lb (129.3 kg)   10/10/24 276 lb (125.2 kg)   09/26/24 280 lb (127 kg)       Physical Exam          ASSESSMENT AND PLAN:   Patient is a 60 year old male who presents primarily presents for:    (Z00.00) Annual physical exam  (primary encounter diagnosis)  Plan: CBC With Differential With Platelet, Comp         Metabolic Panel (14), Lipid Panel          During the annual physical exam I spent extensive time discussing medical history including existing conditions, past surgeries, allergies and medications.  Lifestyle factors discussed including diet, exercise and substance abuse including alcohol and tobacco if warranted.  Counseled on screening tests based on age and underlying risk factors which may include but not limited to blood pressure measurement, cholesterol screening, diabetes screening and prostate cancer screening.  Preventative screenings discussed.  Reviewed immunizations.  Sexual health discussed if appropriate.  Family history reviewed.  I addressed any specific concerns or symptoms that the patient had.        (E11.9) Controlled type 2 diabetes mellitus without complication, without long-term current use of insulin (HCC)  Plan: POC Glycohemoglobin [02735], Microalb/Creat         Ratio, Random Urine, Ophthalmology Referral -         In Network          A1c 6.0.    (Z12.5) Encounter for screening for malignant neoplasm of prostate  Plan: PSA (Screening) [E]            (G47.33) FERNANDO (obstructive sleep apnea)  Plan: On CPAP    (E66.01) Morbid obesity (HCC)  Plan: Weight still elevated.  Discussed possible GLP 1 which he prefers to hold off on at the moment.                   Health Maintenance:    Health Maintenance   Topic Date Due    Annual Physical  Never done    COVID-19 Vaccine (1 - 2024-25 season) Never done    Diabetes Care: Foot Exam (Annual)  01/01/2025    Diabetes Care: Microalb/Creat Ratio (Annual)  01/01/2025    Diabetes Care Dilated Eye Exam  01/31/2025    Diabetes Care: GFR  04/24/2025    PSA  06/28/2025    Diabetes Care A1C  10/28/2025    Colorectal Cancer Screening  09/29/2030    DTaP,Tdap,and Td Vaccines (4 - Td or Tdap) 08/23/2032    Influenza Vaccine  Completed    Annual Depression Screening  Completed    Pneumococcal Vaccine: 50+ Years  Completed    Zoster Vaccines  Completed    Meningococcal B Vaccine  Aged Out         Meds & Refills for this Visit:  Requested Prescriptions      No prescriptions requested or ordered in this encounter       Orders Placed This Encounter   Procedures    POC Glycohemoglobin [32097]    Microalb/Creat Ratio, Random Urine    CBC With Differential With Platelet    Comp Metabolic Panel (14)    Lipid Panel    PSA (Screening) [E]       Imaging & Consults:  OPHTHALMOLOGY - INTERNAL          No follow-ups on file.  Important follow up notes/labs for next visit      Patient indicates understanding of the above recommendations and agrees to the above plan.  Reasurrance and education provided. All questions answered.    Notified to call with any questions, complications, allergies, or worsening or changing symptoms as well as any side effects or complications from the treatments .  Red flags/ ER precautions discussed.    If diagnostic labs or imaging ordered advised patient to contact my office for results  24-48 hours after completion    This note was dictated using dragon speech recognition transcription software.  Typographical and transcription errors may be present.  Please call if any questions.       As part of our commitment to providing you with comprehensive, transparent, and timely access to your health information, we  adhere to the guidelines set forth by the 21st Century Cures Act. This Act enhances your rights to access your electronic health information and ensures that you can easily obtain your medical records.  Please note that the verbage used in this note is intended for medical documentation and communication and may be interpreted as forthright.  Please do not hesitate to contact my office if you have any questions.            Norberto Nunez MD  [facility]                         [1]   Current Outpatient Medications   Medication Sig Dispense Refill    amoxicillin clavulanate 875-125 MG Oral Tab Take 1 tablet by mouth 2 (two) times daily.      amLODIPine 5 MG Oral Tab Take 1 tablet (5 mg total) by mouth daily. 90 tablet 2    atorvastatin 20 MG Oral Tab Take 1 tablet (20 mg total) by mouth nightly. 90 tablet 1    metFORMIN  MG Oral Tablet 24 Hr Take 1 tablet (500 mg total) by mouth daily. 90 tablet 1    Valsartan-hydroCHLOROthiazide 320-25 MG Oral Tab Take 1 tablet by mouth daily. 90 tablet 1    fexofenadine (ALLEGRA ALLERGY) 180 MG Oral Tab Take 1 tablet (180 mg total) by mouth daily. 90 tablet 1    azelastine 0.1 % Nasal Solution 1 spray by Nasal route 2 (two) times daily. 1 each 1    diclofenac 1 % External Gel Apply 2 g topically 4 (four) times daily. 1 each 1    albuterol 108 (90 Base) MCG/ACT Inhalation Aero Soln Inhale 1 puff into the lungs every 4 (four) hours as needed. 1 each 1   [2]   Past Medical History:   Colon adenomas    x3    Diabetes (HCC)    Essential hypertension    High blood pressure    High cholesterol    Hyperlipidemia

## 2025-05-08 ENCOUNTER — OFFICE VISIT (OUTPATIENT)
Dept: PULMONOLOGY | Facility: CLINIC | Age: 60
End: 2025-05-08

## 2025-05-08 ENCOUNTER — TELEPHONE (OUTPATIENT)
Dept: PULMONOLOGY | Facility: CLINIC | Age: 60
End: 2025-05-08

## 2025-05-08 VITALS
WEIGHT: 285 LBS | DIASTOLIC BLOOD PRESSURE: 80 MMHG | HEIGHT: 66 IN | OXYGEN SATURATION: 95 % | HEART RATE: 76 BPM | BODY MASS INDEX: 45.8 KG/M2 | RESPIRATION RATE: 14 BRPM | SYSTOLIC BLOOD PRESSURE: 136 MMHG

## 2025-05-08 DIAGNOSIS — R93.89 ABNORMAL CT OF THE CHEST: ICD-10-CM

## 2025-05-08 DIAGNOSIS — E66.2 OBESITY HYPOVENTILATION SYNDROME (HCC): ICD-10-CM

## 2025-05-08 DIAGNOSIS — G47.33 OSA TREATED WITH BIPAP: Primary | ICD-10-CM

## 2025-05-08 DIAGNOSIS — E66.813 CLASS 3 SEVERE OBESITY WITH SERIOUS COMORBIDITY AND BODY MASS INDEX (BMI) OF 45.0 TO 49.9 IN ADULT, UNSPECIFIED OBESITY TYPE: ICD-10-CM

## 2025-05-08 PROCEDURE — 3008F BODY MASS INDEX DOCD: CPT | Performed by: PHYSICIAN ASSISTANT

## 2025-05-08 PROCEDURE — 99214 OFFICE O/P EST MOD 30 MIN: CPT | Performed by: PHYSICIAN ASSISTANT

## 2025-05-08 PROCEDURE — 3079F DIAST BP 80-89 MM HG: CPT | Performed by: PHYSICIAN ASSISTANT

## 2025-05-08 PROCEDURE — 3075F SYST BP GE 130 - 139MM HG: CPT | Performed by: PHYSICIAN ASSISTANT

## 2025-05-08 NOTE — TELEPHONE ENCOUNTER
Received fax from [facility] for oxygen renewal. Placed in [facility] folder to sign.

## 2025-05-08 NOTE — PATIENT INSTRUCTIONS
Schedule CT Chest by calling [phone].  Continue using BiPAP with 2 L in line oxygen nightly and during naps.  Recommend you increase your total sleep time at night. Aim for minimum 6 hours per night.  Recommend you take your BiPAP with you when you travel. You can disconnect the oxygen and use the BiPAP without oxygen during travel.  I ordered a test to be done overnight at home while you are wearing BiPAP with oxygen to check your oxygen levels. This test was ordered through [facility]. If you do not hear from them within 1-2 weeks, please let me know.  Recommend weight loss. Referral place to Dr. Haji.

## 2025-05-08 NOTE — PROGRESS NOTES
Pulmonary Progress Note    History of Present Illness:  Heath Casillas is a 60 year old male presenting to pulmonary clinic today for follow up. He is here today with his wife who is assisting with translation per patient request. He has very severe FERNANDO/OHS and is on BiPAP 22/17 with 2 L in-line O2. Data download demonstrates average daily usage of 3 hours and 32 minutes with residual respiratory events of 0.5/h. He states his average usage is lower this month as he traveled to [place] for 20 days and did not bring his BiPAP. He goes to bed at midnight and wakes up at 5 AM. He has excessive daytime sleepiness such that he dozes off throughout the day and takes multiple naps. He does not use BiPAP with naps. He does feel better with BiPAP and no longer snores. He did not yet complete overnight oximetry while on BiPAP. He has history of abnormal CT chest with 4.4 cm left lower lobe focal consolidation. This was PET negative 1/2024. He is due for follow up CT chest. He has no respiratory complaints. Denies shortness of breath, cough, and wheezing.    Social History: , has 2 kids, landscaping  -Tobacco: Lifelong nonsmoker  -Alcohol: Occasional  -Pets: Dog    Medications: Amlodipine, atorvastatin, metformin er, valsartan-hydrochlorothiazide, fexofenadine, azelastine, diclofenac, albuterol    Review of Systems:   Constitutional: No fever or chills. No weight loss or weight gain.  HEENT: No congestion or postnasal drip.  Cardio: No chest pain.  Respiratory: See HPI.  GI: No acid reflux.  Extremities: Chronic right foot swelling. No left lower extremity swelling.  Neurologic: No headache.  Skin: No rash.  Psych: No depression.     Physical Exam:  /80   Pulse 76   Resp 14   Ht 5' 6\" (1.676 m)   Wt 285 lb (129.3 kg)   SpO2 95%   BMI 46.00 kg/m²    Constitutional: Morbidly obese. No acute distress.   HEENT: Head NC/AT. PEERL. Crowded oropharynx. Mallampati class 4.  Cardio: Regular rate and rhythm.  Normal S1 and S2. No murmurs.   Respiratory: Thorax symmetrical with no labored breathing. Clear to ausculation bilaterally with symmetrical breath sounds. No wheezing, rhonchi, or crackles.   Extremities: No clubbing or cyanosis. Trace RLE edema. No LLE edema. No calf tenderness.  Neurologic: A&Ox3. No gross motor deficits.  Skin: Warm, dry.  Psych: Calm, cooperative. Pleasant affect.    Results:  -CT Chest 5/29/2024:  Mild decrease in size of a 4.4 cm of posterior pleural based consolidation, without hypermetabolic activity on recent PET imaging (previously 5.3 cm), thought to represent round atelectasis.       No new suspicious pulmonary nodule.      Unchanged trace left pleural effusion.  Thought to be a chronic finding due to associated mild diffuse pleural thickening.      Indeterminate 1.2 cm left adrenal lesion unchanged.       -PET 1/2/2024:  1. A rounded opacity of the left lower lobe may reflect rounded atelectasis; there is no hypermetabolic activity.   2. Trace left pleural effusion.   3. Uncomplicated distal colonic diverticulosis.   4. Sequela of remote granulomatous disease.   5. Lesser incidental findings as above.     -PAP titration study 12/2023: BiPAP 22/17 CWP    -Polysomnography 12/2023: Profound FERNANDO with combined AHI 97.5    Assessment/Plan:  Severe FERNANDO/OHS  On BiPAP 22/17 with 2 L in-line O2. Tolerating BiPAP but needs to increase use and encourage him to take with him while he travels. FERNANDO is well treated on BiPAP with residual events of 0.5/h. He has excessive daytime sleepiness due to inadequate sleep at night. Recommend he increase total sleep time by 1-2 hours. Also encouraged him to use BiPAP during naps.  Plan:  -BiPAP with 2 L in-line O2 nightly and with naps  -Overnight oximetry to be completed while on BiPAP with 2 L O2  -Weight loss and referral to bariatrics  -Avoid alcohol and sedating drugs  -Never drive if sleepy  -Increase total sleep time by 1-2 hours  -Follow up in 6 months  or sooner if needed    Abnormal CT chest  CT chest 5/2024 with slight decrease in left lower lobe consolidation. This was PET negative 1/2024. Likely atelectasis.  Plan:  -Due for 1 year follow up CT chest    Salo Strickland PA-C  Pulmonary Medicine  5/8/2025

## 2025-05-15 ENCOUNTER — TELEPHONE (OUTPATIENT)
Age: 60
End: 2025-05-15

## 2025-05-15 NOTE — TELEPHONE ENCOUNTER
Spoke with Layla, (unable to discuss test results) with her. She will let patient now to call the office to discuss test results.

## 2025-05-15 NOTE — TELEPHONE ENCOUNTER
----- Message from Norberto Nunez sent at 5/14/2025  9:36 AM CDT -----  Please inform that liver enzymes are mildly elevated.  Needs to focus on weight loss.  Rest sky.       PQ  ----- Message -----  From: Lab, Background User  Sent: 4/28/2025   2:44 PM CDT  To: Norberto Nunez MD

## 2025-07-07 RX ORDER — VALSARTAN AND HYDROCHLOROTHIAZIDE 320; 25 MG/1; MG/1
1 TABLET, FILM COATED ORAL DAILY
Qty: 90 TABLET | Refills: 3 | Status: SHIPPED | OUTPATIENT
Start: 2025-07-07

## 2025-07-07 NOTE — TELEPHONE ENCOUNTER
Refill passes per [facility] protocol.    No future appointments with primary care medicine

## (undated) DEVICE — 60 ML SYRINGE REGULAR TIP: Brand: MONOJECT

## (undated) DEVICE — Device: Brand: DUAL NARE NASAL CANNULAE FEMALE LUER CON 7FT O2 TUBE

## (undated) DEVICE — KIT CLEAN ENDOKIT 1.1OZ GOWNX2

## (undated) DEVICE — FORCEPS BX L240CM DIA2.4MM L NDL RAD JAW 4

## (undated) DEVICE — TRAP POLYP W/ 2 SPEC TY CLR MAGNIFYING WIND

## (undated) DEVICE — SNARE ENDOSCOPIC 10MM ROUND

## (undated) DEVICE — KIT ENDO ORCAPOD 160/180/190

## (undated) NOTE — LETTER
4/4/2025              Heath Casillas        [address] [address]         Dear Heath,    It has come to our attention that a required CT CHEST is due in May 2025.     This test requires a prior authorization from your insurance. Please call central scheduling at [phone] to schedule the test. Once scheduled, our managed care department will work on obtaining authorization. The Managed Care Department can be reached at [phone] for questions regarding authorization.    If you have any other questions, please contact our office at [phone].       Sincerely,    Salo Strickland PA-C

## (undated) NOTE — ED AVS SNAPSHOT
Matilde Rivera   MRN: [id number]    Department:  [facility] Emergency Department   Date of Visit:  6/19/2018           Disclosure     Insurance plans vary and the physician(s) referred by the ER may not be covered by your plan.  Please contac CARE PHYSICIAN AT ONCE OR RETURN IMMEDIATELY TO THE EMERGENCY DEPARTMENT. If you have been prescribed any medication(s), please fill your prescription right away and begin taking the medication(s) as directed.   If you believe that any of the medications

## (undated) NOTE — ED AVS SNAPSHOT
Ky Cedillo   MRN: [id number]    Department:  [facility] Emergency Department   Date of Visit:  6/2/2019           Disclosure     Insurance plans vary and the physician(s) referred by the ER may not be covered by your plan.  Please contact CARE PHYSICIAN AT ONCE OR RETURN IMMEDIATELY TO THE EMERGENCY DEPARTMENT. If you have been prescribed any medication(s), please fill your prescription right away and begin taking the medication(s) as directed.   If you believe that any of the medications

## (undated) NOTE — LETTER
12/1/2023              Heathgali Casillas        [address] [address]         To Whom It May Concern,    Sussy Alas has a history of known very severe obstructive sleep apnea with combined apnea hypopnea index of 98 on polysomnography in 2018. He has polycythemia and elevated bicarbonate with daytime hypoxemia suggestive of obesity hypoventilation syndrome. Patient was never set up with PAP therapy and is currently untreated. Untreated obstructive sleep apnea and obesity hypoventilation syndrome increase the patient's overall morbidity and mortality and increase the risk of cardiovascular and cerebrovascular events. Without eventual initiation of PAP therapy, patient is at increased risk of hospitalizations for respiratory failure and even death.     Sincerely,        Joo Gilmore PA-C  [facility], [address] [place] [address]  [place] [address] [place] [address]  [address] [facility] [id number] [phone]

## (undated) NOTE — ED AVS SNAPSHOT
Ishmael Castaneda   MRN: [id number]    Department:  [facility] Emergency Department   Date of Visit:  9/2/2019           Disclosure     Insurance plans vary and the physician(s) referred by the ER may not be covered by your plan.  Please contact CARE PHYSICIAN AT ONCE OR RETURN IMMEDIATELY TO THE EMERGENCY DEPARTMENT. If you have been prescribed any medication(s), please fill your prescription right away and begin taking the medication(s) as directed.   If you believe that any of the medications